# Patient Record
Sex: FEMALE | Race: WHITE | NOT HISPANIC OR LATINO | Employment: OTHER | URBAN - METROPOLITAN AREA
[De-identification: names, ages, dates, MRNs, and addresses within clinical notes are randomized per-mention and may not be internally consistent; named-entity substitution may affect disease eponyms.]

---

## 2017-04-20 ENCOUNTER — HOSPITAL ENCOUNTER (OUTPATIENT)
Dept: MRI IMAGING | Facility: HOSPITAL | Age: 57
Discharge: HOME | End: 2017-04-20
Attending: FAMILY MEDICINE | Admitting: FAMILY MEDICINE

## 2017-07-11 ENCOUNTER — CONVERSION ENCOUNTER (OUTPATIENT)
Dept: CARDIOLOGY | Facility: CLINIC | Age: 57
End: 2017-07-11

## 2017-08-02 ENCOUNTER — CONVERSION ENCOUNTER (OUTPATIENT)
Dept: CARDIOLOGY | Facility: CLINIC | Age: 57
End: 2017-08-02

## 2018-07-10 ENCOUNTER — HOSPITAL ENCOUNTER (OUTPATIENT)
Dept: MRI IMAGING | Facility: HOSPITAL | Age: 58
Discharge: HOME | End: 2018-07-10
Attending: ORTHOPAEDIC SURGERY | Admitting: ORTHOPAEDIC SURGERY

## 2019-03-12 ENCOUNTER — HOSPITAL ENCOUNTER (OUTPATIENT)
Dept: LAB | Facility: HOSPITAL | Age: 59
Discharge: HOME | End: 2019-03-12
Attending: NURSE PRACTITIONER | Admitting: NURSE PRACTITIONER

## 2019-03-12 ENCOUNTER — CONVERSION ENCOUNTER (OUTPATIENT)
Dept: CARDIOLOGY | Facility: CLINIC | Age: 59
End: 2019-03-12

## 2019-03-12 LAB
ALBUMIN SERPL-MCNC: 4 G/DL (ref 3.5–4.8)
ALBUMIN/GLOB SERPL: 1 {RATIO} (ref 1–1.7)
ALP SERPL-CCNC: 76 IU/L (ref 32–91)
ALT SERPL-CCNC: 22 IU/L (ref 14–54)
ANION GAP SERPL CALC-SCNC: 20.7 MMOL/L (ref 10–20)
AST SERPL-CCNC: 31 IU/L (ref 15–41)
BASOPHILS # BLD AUTO: 0 10*3/UL (ref 0–0.2)
BASOPHILS NFR BLD AUTO: 0 % (ref 0–2)
BILIRUB SERPL-MCNC: 1.3 MG/DL (ref 0.3–1.2)
BUN SERPL-MCNC: 14 MG/DL (ref 8–20)
BUN/CREAT SERPL: 17.5 (ref 5.4–26.2)
CALCIUM SERPL-MCNC: 9.5 MG/DL (ref 8.9–10.3)
CHLORIDE SERPL-SCNC: 93 MMOL/L (ref 101–111)
CONV CO2: 25 MMOL/L (ref 22–32)
CONV TOTAL PROTEIN: 8.1 G/DL (ref 6.1–7.9)
CREAT UR-MCNC: 0.8 MG/DL (ref 0.4–1)
DIFFERENTIAL METHOD BLD: (no result)
DIGOXIN SERPL-MCNC: 0.6 NG/ML (ref 0.8–2)
EOSINOPHIL # BLD AUTO: 0.1 10*3/UL (ref 0–0.3)
EOSINOPHIL # BLD AUTO: 1 % (ref 0–3)
ERYTHROCYTE [DISTWIDTH] IN BLOOD BY AUTOMATED COUNT: 14.2 % (ref 11.5–14.5)
GLOBULIN UR ELPH-MCNC: 4.1 G/DL (ref 2.5–3.8)
GLUCOSE SERPL-MCNC: 138 MG/DL (ref 65–99)
HCT VFR BLD AUTO: 54.8 % (ref 35–49)
HGB BLD-MCNC: (no result) G/DL (ref 12–15)
HGB BLD-MCNC: 2002 G/DL
LYMPHOCYTES # BLD AUTO: 2.8 10*3/UL (ref 0.8–4.8)
LYMPHOCYTES NFR BLD AUTO: 35 % (ref 18–42)
MAGNESIUM SERPL-MCNC: 1.9 MG/DL (ref 1.8–2.5)
MCH RBC QN AUTO: 31.2 PG (ref 26–32)
MCHC RBC AUTO-ENTMCNC: 33.9 G/DL (ref 32–36)
MCV RBC AUTO: 92 FL (ref 80–94)
MONOCYTES # BLD AUTO: 0.8 10*3/UL (ref 0.1–1.3)
MONOCYTES NFR BLD AUTO: 10 % (ref 2–11)
NEUTROPHILS # BLD AUTO: 4.3 10*3/UL (ref 2.3–8.6)
NEUTROPHILS NFR BLD AUTO: 54 % (ref 50–75)
NRBC BLD AUTO-RTO: 0 /100{WBCS}
NRBC/RBC NFR BLD MANUAL: 0 10*3/UL
PLATELET # BLD AUTO: 211 10*3/UL (ref 150–450)
PMV BLD AUTO: 8.1 FL (ref 7.4–10.4)
POTASSIUM SERPL-SCNC: 3.7 MMOL/L (ref 3.6–5.1)
RBC # BLD AUTO: 5.95 10*6/UL (ref 4–5.4)
SODIUM SERPL-SCNC: 135 MMOL/L (ref 136–144)
WBC # BLD AUTO: 7.9 10*3/UL (ref 4.5–11.5)

## 2019-04-25 ENCOUNTER — HOSPITAL ENCOUNTER (OUTPATIENT)
Dept: NUCLEAR MEDICINE | Facility: HOSPITAL | Age: 59
Discharge: HOME | End: 2019-04-25
Attending: NURSE PRACTITIONER | Admitting: NURSE PRACTITIONER

## 2019-06-04 VITALS
HEART RATE: 97 BPM | BODY MASS INDEX: 36.68 KG/M2 | WEIGHT: 254 LBS | SYSTOLIC BLOOD PRESSURE: 110 MMHG | SYSTOLIC BLOOD PRESSURE: 120 MMHG | HEIGHT: 68 IN | DIASTOLIC BLOOD PRESSURE: 75 MMHG | HEIGHT: 68 IN | BODY MASS INDEX: 36.83 KG/M2 | WEIGHT: 242 LBS | HEART RATE: 80 BPM | HEIGHT: 68 IN | HEART RATE: 101 BPM | DIASTOLIC BLOOD PRESSURE: 62 MMHG | WEIGHT: 243 LBS | SYSTOLIC BLOOD PRESSURE: 120 MMHG | DIASTOLIC BLOOD PRESSURE: 90 MMHG | BODY MASS INDEX: 38.49 KG/M2

## 2019-06-21 ENCOUNTER — OFFICE (OUTPATIENT)
Dept: URBAN - METROPOLITAN AREA CLINIC 64 | Facility: CLINIC | Age: 59
End: 2019-06-21

## 2019-06-21 ENCOUNTER — TRANSCRIBE ORDERS (OUTPATIENT)
Dept: ULTRASOUND IMAGING | Facility: HOSPITAL | Age: 59
End: 2019-06-21

## 2019-06-21 VITALS
SYSTOLIC BLOOD PRESSURE: 128 MMHG | HEIGHT: 68 IN | HEART RATE: 91 BPM | DIASTOLIC BLOOD PRESSURE: 76 MMHG | WEIGHT: 232 LBS

## 2019-06-21 DIAGNOSIS — Z12.11 ENCOUNTER FOR SCREENING FOR MALIGNANT NEOPLASM OF COLON: ICD-10-CM

## 2019-06-21 DIAGNOSIS — B18.2 CHRONIC HEPATITIS C WITHOUT HEPATIC COMA (HCC): Primary | ICD-10-CM

## 2019-06-21 DIAGNOSIS — K21.9 GASTRO-ESOPHAGEAL REFLUX DISEASE WITHOUT ESOPHAGITIS: ICD-10-CM

## 2019-06-21 DIAGNOSIS — B18.2 CHRONIC VIRAL HEPATITIS C: ICD-10-CM

## 2019-06-21 PROCEDURE — 99203 OFFICE O/P NEW LOW 30 MIN: CPT | Performed by: INTERNAL MEDICINE

## 2019-07-01 ENCOUNTER — APPOINTMENT (OUTPATIENT)
Dept: LAB | Facility: HOSPITAL | Age: 59
End: 2019-07-01

## 2019-07-01 ENCOUNTER — APPOINTMENT (OUTPATIENT)
Dept: ULTRASOUND IMAGING | Facility: HOSPITAL | Age: 59
End: 2019-07-01

## 2019-07-01 ENCOUNTER — HOSPITAL ENCOUNTER (OUTPATIENT)
Dept: ONCOLOGY | Facility: HOSPITAL | Age: 59
Setting detail: INFUSION SERIES
Discharge: HOME | End: 2019-07-01

## 2019-07-01 ENCOUNTER — CONSULT (OUTPATIENT)
Dept: ONCOLOGY | Facility: CLINIC | Age: 59
End: 2019-07-01

## 2019-07-01 VITALS
HEIGHT: 68 IN | SYSTOLIC BLOOD PRESSURE: 117 MMHG | TEMPERATURE: 98.1 F | BODY MASS INDEX: 37.13 KG/M2 | RESPIRATION RATE: 18 BRPM | DIASTOLIC BLOOD PRESSURE: 75 MMHG | WEIGHT: 245 LBS | HEART RATE: 90 BPM

## 2019-07-01 VITALS — HEART RATE: 84 BPM | DIASTOLIC BLOOD PRESSURE: 74 MMHG | SYSTOLIC BLOOD PRESSURE: 112 MMHG

## 2019-07-01 DIAGNOSIS — D75.1 POLYCYTHEMIA: ICD-10-CM

## 2019-07-01 DIAGNOSIS — D75.1 POLYCYTHEMIA: Primary | ICD-10-CM

## 2019-07-01 LAB
ALBUMIN SERPL-MCNC: 3.6 G/DL (ref 3.5–4.8)
ALBUMIN/GLOB SERPL: 1.1 G/DL (ref 1–1.7)
ALP SERPL-CCNC: 67 U/L (ref 32–91)
ALT SERPL W P-5'-P-CCNC: 26 U/L (ref 14–54)
ANION GAP SERPL CALCULATED.3IONS-SCNC: 16.6 MMOL/L (ref 10–20)
AST SERPL-CCNC: 23 U/L (ref 15–41)
BASOPHILS # BLD AUTO: 0.03 10*3/MM3 (ref 0–0.2)
BASOPHILS NFR BLD AUTO: 0.5 % (ref 0–1.5)
BILIRUB SERPL-MCNC: 0.9 MG/DL (ref 0.3–1.2)
BUN BLD-MCNC: 7 MG/DL (ref 8–20)
BUN/CREAT SERPL: 11.7 (ref 5.4–26.2)
CALCIUM SPEC-SCNC: 8.6 MG/DL (ref 8.9–10.3)
CHLORIDE SERPL-SCNC: 98 MMOL/L (ref 101–111)
CO2 SERPL-SCNC: 27 MMOL/L (ref 22–32)
CREAT BLD-MCNC: 0.6 MG/DL (ref 0.4–1)
DEPRECATED RDW RBC AUTO: 45.5 FL (ref 37–54)
EOSINOPHIL # BLD AUTO: 0.1 10*3/MM3 (ref 0–0.4)
EOSINOPHIL NFR BLD AUTO: 1.6 % (ref 0.3–6.2)
ERYTHROCYTE [DISTWIDTH] IN BLOOD BY AUTOMATED COUNT: 14 % (ref 12.3–15.4)
GFR SERPL CREATININE-BSD FRML MDRD: 103 ML/MIN/1.73
GLOBULIN UR ELPH-MCNC: 3.4 GM/DL (ref 2.5–3.8)
GLUCOSE BLD-MCNC: 111 MG/DL (ref 65–99)
HCT VFR BLD AUTO: 51 % (ref 34–46.6)
HGB BLD-MCNC: 16.9 G/DL (ref 12–15.9)
LYMPHOCYTES # BLD AUTO: 2.47 10*3/MM3 (ref 0.7–3.1)
LYMPHOCYTES NFR BLD AUTO: 38.5 % (ref 19.6–45.3)
MCH RBC QN AUTO: 29.9 PG (ref 26.6–33)
MCHC RBC AUTO-ENTMCNC: 33.1 G/DL (ref 31.5–35.7)
MCV RBC AUTO: 90.1 FL (ref 79–97)
MONOCYTES # BLD AUTO: 0.58 10*3/MM3 (ref 0.1–0.9)
MONOCYTES NFR BLD AUTO: 9 % (ref 5–12)
NEUTROPHILS # BLD AUTO: 3.23 10*3/MM3 (ref 1.7–7)
NEUTROPHILS NFR BLD AUTO: 50.4 % (ref 42.7–76)
PLATELET # BLD AUTO: 155 10*3/MM3 (ref 140–450)
PMV BLD AUTO: 9.5 FL (ref 6–12)
POTASSIUM BLD-SCNC: 3.6 MMOL/L (ref 3.6–5.1)
PROT SERPL-MCNC: 7 G/DL (ref 6.1–7.9)
RBC # BLD AUTO: 5.66 10*6/MM3 (ref 3.77–5.28)
SODIUM BLD-SCNC: 138 MMOL/L (ref 136–144)
WBC NRBC COR # BLD: 6.41 10*3/MM3 (ref 3.4–10.8)

## 2019-07-01 PROCEDURE — 88273 CYTOGENETICS 10-30: CPT

## 2019-07-01 PROCEDURE — 99195 PHLEBOTOMY: CPT | Performed by: INTERNAL MEDICINE

## 2019-07-01 PROCEDURE — 80053 COMPREHEN METABOLIC PANEL: CPT | Performed by: NURSE PRACTITIONER

## 2019-07-01 PROCEDURE — 88275 CYTOGENETICS 100-300: CPT

## 2019-07-01 PROCEDURE — 88271 CYTOGENETICS DNA PROBE: CPT

## 2019-07-01 PROCEDURE — 99205 OFFICE O/P NEW HI 60 MIN: CPT | Performed by: INTERNAL MEDICINE

## 2019-07-01 PROCEDURE — 85025 COMPLETE CBC W/AUTO DIFF WBC: CPT | Performed by: INTERNAL MEDICINE

## 2019-07-01 PROCEDURE — 36415 COLL VENOUS BLD VENIPUNCTURE: CPT | Performed by: INTERNAL MEDICINE

## 2019-07-01 RX ORDER — ALBUTEROL SULFATE 90 UG/1
AEROSOL, METERED RESPIRATORY (INHALATION)
COMMUNITY
Start: 2019-03-12

## 2019-07-01 RX ORDER — BUMETANIDE 2 MG/1
TABLET ORAL
COMMUNITY
Start: 2015-03-31

## 2019-07-01 RX ORDER — HYDROCODONE BITARTRATE AND ACETAMINOPHEN 10; 325 MG/1; MG/1
TABLET ORAL
COMMUNITY
Start: 2015-03-31 | End: 2022-05-25

## 2019-07-01 RX ORDER — DIGOXIN 125 MCG
125 TABLET ORAL DAILY
COMMUNITY
Start: 2015-05-08

## 2019-07-01 RX ORDER — CARVEDILOL 12.5 MG/1
TABLET ORAL
COMMUNITY
Start: 2017-08-30 | End: 2019-11-12 | Stop reason: SDUPTHER

## 2019-07-01 RX ORDER — FENTANYL 100 UG/H
PATCH TRANSDERMAL
COMMUNITY
Start: 2015-03-31 | End: 2022-05-25

## 2019-07-01 RX ORDER — DILTIAZEM HYDROCHLORIDE 120 MG/1
120 TABLET, FILM COATED ORAL DAILY
COMMUNITY
End: 2019-10-10 | Stop reason: SDUPTHER

## 2019-07-01 RX ORDER — LORATADINE 10 MG/1
TABLET ORAL EVERY 24 HOURS
COMMUNITY
Start: 2019-03-12

## 2019-07-01 RX ORDER — ALPRAZOLAM 0.5 MG/1
TABLET ORAL
COMMUNITY
Start: 2017-08-02

## 2019-07-01 RX ORDER — RANITIDINE 150 MG/1
CAPSULE ORAL
COMMUNITY
Start: 2015-08-07 | End: 2022-05-25

## 2019-07-01 RX ORDER — CHOLECALCIFEROL (VITAMIN D3) 125 MCG
5 CAPSULE ORAL NIGHTLY
COMMUNITY
End: 2022-07-19

## 2019-07-01 NOTE — PROGRESS NOTES
Hematology/Oncology Outpatient Consultation    Ana Griggs  1960    Primary Care Physician: Clay Watson MD  Referring Physician: Reg Nava MD  Reason For Consult: abnormal cbc    No chief complaint on file.  polycythemia    History of Present Illness:  Pt started smoking at age 17. Lab work up reveals highly elevated hemoglobin. Pt was sent to [facility] and seen initially on 7/1/19  History reviewed. No pertinent past medical history.    History reviewed. No pertinent surgical history.      Current Outpatient Medications:   •  albuterol sulfate HFA (PROAIR HFA) 108 (90 Base) MCG/ACT inhaler, PROAIR  (90 Base) MCG/ACT AERS, Disp: , Rfl:   •  ALPRAZolam (XANAX) 0.5 MG tablet, ALPRAZOLAM 0.5 MG TABS, Disp: , Rfl:   •  aspirin 81 MG tablet, ASPIRIN 81 MG ORAL TABLET, Disp: , Rfl:   •  bumetanide (BUMEX) 2 MG tablet, BUMETANIDE 2 MG TABS, Disp: , Rfl:   •  carvedilol (COREG) 6.25 MG tablet, CARVEDILOL 6.25 MG TABS, Disp: , Rfl:   •  digoxin (LANOXIN) 125 MCG tablet, Take 125 mcg by mouth Daily., Disp: , Rfl:   •  diltiaZEM (CARDIZEM) 60 MG tablet, Take 60 mg by mouth 4 (Four) Times a Day., Disp: , Rfl:   •  Empagliflozin (JARDIANCE) 25 MG tablet, JARDIANCE 25 MG TABS, Disp: , Rfl:   •  fentaNYL (DURAGESIC) 100 MCG/HR patch, FENTANYL 100 MCG/HR PT72, Disp: , Rfl:   •  HYDROcodone-acetaminophen (NORCO)  MG per tablet, HYDROCODONE-ACETAMINOPHEN  MG TABS, Disp: , Rfl:   •  loratadine (CLARITIN) 10 MG tablet, Daily., Disp: , Rfl:   •  melatonin 5 MG tablet tablet, Take 5 mg by mouth Every Night., Disp: , Rfl:   •  metFORMIN (GLUCOPHAGE) 500 MG tablet, METFORMIN  MG TABS, Disp: , Rfl:   •  ranitidine (ZANTAC) 150 MG capsule, RANITIDINE  MG CAPS, Disp: , Rfl:   •  rivaroxaban (XARELTO) 20 MG tablet, XARELTO 20 MG TABS, Disp: , Rfl:     No Known Allergies      There is no immunization history on file for this patient.    History reviewed. No pertinent family  "history.    Cancer-related family history is not on file.    Social History     Tobacco Use   • Smoking status: Not on file   Substance Use Topics   • Alcohol use: Not on file   • Drug use: Not on file     soa on exertion. Reports to forgetful ness. Drowsy.  ROS:     Review of Systems   HENT: Negative for nosebleeds and trouble swallowing.    Eyes: Negative for visual disturbance.   Respiratory: Negative for cough, shortness of breath and wheezing.    Cardiovascular: Negative for chest pain.   Gastrointestinal: Negative for abdominal pain and blood in stool.   Genitourinary: Negative for dysuria and hematuria.   Musculoskeletal: Negative for joint swelling.   Skin: Negative for rash.   Neurological: Negative for seizures.   Hematological: Does not bruise/bleed easily.   Psychiatric/Behavioral: The patient is not nervous/anxious.        Objective:    Vitals:    07/01/19 1433   BP: 117/75   Pulse: 90   Resp: 18   Temp: 98.1 °F (36.7 °C)   Weight: 111 kg (245 lb)   Height: 172.7 cm (68\")   PainSc:   8   PainLoc: Knee  Comment: left           Physical Exam:    Physical Exam   Constitutional: She is oriented to person, place, and time.   HENT:   Head: Normocephalic and atraumatic.   Neck: Trachea normal and normal range of motion.   Cardiovascular: S1 normal and S2 normal.   Pulmonary/Chest: Breath sounds normal.   Abdominal: Soft. Bowel sounds are normal. There is no hepatosplenomegaly.   Neurological: She is alert and oriented to person, place, and time. She has normal strength.   Skin: Skin is warm and dry.   Psychiatric: She has a normal mood and affect. Her speech is normal.       RECENT LABS  WBC   Date Value Ref Range Status   07/01/2019 6.41 3.40 - 10.80 10*3/mm3 Final     RBC   Date Value Ref Range Status   07/01/2019 5.66 (H) 3.77 - 5.28 10*6/mm3 Final     Hemoglobin   Date Value Ref Range Status   07/01/2019 16.9 (H) 12.0 - 15.9 g/dL Final     Hematocrit   Date Value Ref Range Status   07/01/2019 51.0 (H) 34.0 " - 46.6 % Final     MCV   Date Value Ref Range Status   07/01/2019 90.1 79.0 - 97.0 fL Final     MCH   Date Value Ref Range Status   07/01/2019 29.9 26.6 - 33.0 pg Final     MCHC   Date Value Ref Range Status   07/01/2019 33.1 31.5 - 35.7 g/dL Final     RDW   Date Value Ref Range Status   07/01/2019 14.0 12.3 - 15.4 % Final     RDW-SD   Date Value Ref Range Status   07/01/2019 45.5 37.0 - 54.0 fl Final     MPV   Date Value Ref Range Status   07/01/2019 9.5 6.0 - 12.0 fL Final     Platelets   Date Value Ref Range Status   07/01/2019 155 140 - 450 10*3/mm3 Final     Neutrophil %   Date Value Ref Range Status   07/01/2019 50.4 42.7 - 76.0 % Final     Lymphocyte %   Date Value Ref Range Status   07/01/2019 38.5 19.6 - 45.3 % Final     Monocyte %   Date Value Ref Range Status   07/01/2019 9.0 5.0 - 12.0 % Final     Eosinophil %   Date Value Ref Range Status   07/01/2019 1.6 0.3 - 6.2 % Final     Basophil %   Date Value Ref Range Status   07/01/2019 0.5 0.0 - 1.5 % Final     Neutrophils, Absolute   Date Value Ref Range Status   07/01/2019 3.23 1.70 - 7.00 10*3/mm3 Final     Lymphocytes, Absolute   Date Value Ref Range Status   07/01/2019 2.47 0.70 - 3.10 10*3/mm3 Final     Monocytes, Absolute   Date Value Ref Range Status   07/01/2019 0.58 0.10 - 0.90 10*3/mm3 Final     Eosinophils, Absolute   Date Value Ref Range Status   07/01/2019 0.10 0.00 - 0.40 10*3/mm3 Final     Basophils, Absolute   Date Value Ref Range Status   07/01/2019 0.03 0.00 - 0.20 10*3/mm3 Final     nRBC   Date Value Ref Range Status   03/12/2019 0 0 /100[WBCs] Final       Lab Results   Component Value Date    GLUCOSE 138 (H) 03/12/2019    BUN 14 03/12/2019    CREATININE 0.8 03/12/2019    BCR 17.5 03/12/2019    K 3.7 03/12/2019    CO2 25 03/12/2019    CALCIUM 9.5 03/12/2019    ALBUMIN 4.0 03/12/2019    LABIL2 1.0 03/12/2019    AST 31 03/12/2019    ALT 22 03/12/2019         Assessment    1. Secondary polycythemia  2. Tobacco  abuse  3. Obesity  4. chf  5. ecog1    Plan:    1. Start phlebotomy today. 500cc today  2. Check JAK2, BCR-abl1  3. Loose weight  4. 4. rtc 2 weeks      I have reviewed labs results, imaging, vitals, and medications with the patient today.      I counselled Ana of the risks of continuing to use tobacco and cessation.    During this visit, I spent 3-10 minutes counseling the patient regarding tobacco cessation.     Patient verbalized understanding and is in agreement of the above plan.        This report was compiled using Dragon voice recognition software. I have made every effort to proof read this document; however, typographical errors may persist.

## 2019-07-05 LAB
JAK2 EXON 12 MUT TESTED BLD/T: NORMAL
JAK2 P.V617F BLD/T QL: NORMAL
LAB DIRECTOR NAME PROVIDER: NORMAL
REF LAB TEST METHOD: NORMAL
REFERENCES: NORMAL

## 2019-07-11 LAB
CELLS ANALYZED: 200
CELLS COUNTED: 200
CHROM ANALY RESULT (ISCN): NORMAL
INTERPRETATION: NORMAL
LAB DIRECTOR NAME PROVIDER: NORMAL
SPECIMEN SOURCE: NORMAL

## 2019-08-12 RX ORDER — SULFAMETHOXAZOLE AND TRIMETHOPRIM 800; 160 MG/1; MG/1
1 TABLET ORAL 2 TIMES DAILY
COMMUNITY
Start: 2019-08-07 | End: 2022-05-25

## 2019-08-12 RX ORDER — PROMETHAZINE HYDROCHLORIDE 25 MG/1
25 TABLET ORAL
COMMUNITY

## 2019-08-14 ENCOUNTER — ANESTHESIA EVENT (OUTPATIENT)
Dept: GASTROENTEROLOGY | Facility: HOSPITAL | Age: 59
End: 2019-08-14

## 2019-08-15 ENCOUNTER — ON CAMPUS - OUTPATIENT (OUTPATIENT)
Dept: URBAN - METROPOLITAN AREA HOSPITAL 85 | Facility: HOSPITAL | Age: 59
End: 2019-08-15

## 2019-08-15 ENCOUNTER — ANESTHESIA (OUTPATIENT)
Dept: GASTROENTEROLOGY | Facility: HOSPITAL | Age: 59
End: 2019-08-15

## 2019-08-15 ENCOUNTER — HOSPITAL ENCOUNTER (OUTPATIENT)
Facility: HOSPITAL | Age: 59
Setting detail: HOSPITAL OUTPATIENT SURGERY
Discharge: HOME | End: 2019-08-15
Attending: INTERNAL MEDICINE | Admitting: INTERNAL MEDICINE

## 2019-08-15 VITALS
SYSTOLIC BLOOD PRESSURE: 109 MMHG | TEMPERATURE: 98.4 F | RESPIRATION RATE: 16 BRPM | OXYGEN SATURATION: 97 % | HEIGHT: 68 IN | BODY MASS INDEX: 37.39 KG/M2 | HEART RATE: 96 BPM | WEIGHT: 246.69 LBS | DIASTOLIC BLOOD PRESSURE: 80 MMHG

## 2019-08-15 DIAGNOSIS — Z12.11 ENCOUNTER FOR SCREENING FOR MALIGNANT NEOPLASM OF COLON: ICD-10-CM

## 2019-08-15 DIAGNOSIS — D12.3 BENIGN NEOPLASM OF TRANSVERSE COLON: ICD-10-CM

## 2019-08-15 DIAGNOSIS — K57.30 DIVERTICULOSIS OF LARGE INTESTINE WITHOUT PERFORATION OR ABS: ICD-10-CM

## 2019-08-15 DIAGNOSIS — D12.2 BENIGN NEOPLASM OF ASCENDING COLON: ICD-10-CM

## 2019-08-15 DIAGNOSIS — K63.5 POLYP OF TRANSVERSE COLON: ICD-10-CM

## 2019-08-15 DIAGNOSIS — K64.8 OTHER HEMORRHOIDS: ICD-10-CM

## 2019-08-15 DIAGNOSIS — K63.5 POLYP OF ASCENDING COLON: ICD-10-CM

## 2019-08-15 LAB
GLUCOSE BLDC GLUCOMTR-MCNC: 129 MG/DL (ref 70–105)
GLUCOSE BLDC GLUCOMTR-MCNC: 138 MG/DL (ref 70–105)

## 2019-08-15 PROCEDURE — 82962 GLUCOSE BLOOD TEST: CPT

## 2019-08-15 PROCEDURE — 45385 COLONOSCOPY W/LESION REMOVAL: CPT | Mod: PT | Performed by: INTERNAL MEDICINE

## 2019-08-15 PROCEDURE — 88305 TISSUE EXAM BY PATHOLOGIST: CPT | Performed by: INTERNAL MEDICINE

## 2019-08-15 PROCEDURE — 25010000002 PROPOFOL 10 MG/ML EMULSION: Performed by: ANESTHESIOLOGY

## 2019-08-15 RX ORDER — IPRATROPIUM BROMIDE AND ALBUTEROL SULFATE 2.5; .5 MG/3ML; MG/3ML
3 SOLUTION RESPIRATORY (INHALATION) ONCE AS NEEDED
Status: DISCONTINUED | OUTPATIENT
Start: 2019-08-15 | End: 2019-08-15 | Stop reason: HOSPADM

## 2019-08-15 RX ORDER — PROMETHAZINE HYDROCHLORIDE 25 MG/ML
6.25 INJECTION, SOLUTION INTRAMUSCULAR; INTRAVENOUS ONCE AS NEEDED
Status: DISCONTINUED | OUTPATIENT
Start: 2019-08-15 | End: 2019-08-15 | Stop reason: HOSPADM

## 2019-08-15 RX ORDER — SODIUM CHLORIDE 0.9 % (FLUSH) 0.9 %
3-10 SYRINGE (ML) INJECTION AS NEEDED
Status: DISCONTINUED | OUTPATIENT
Start: 2019-08-15 | End: 2019-08-15 | Stop reason: HOSPADM

## 2019-08-15 RX ORDER — ONDANSETRON 2 MG/ML
4 INJECTION INTRAMUSCULAR; INTRAVENOUS ONCE AS NEEDED
Status: DISCONTINUED | OUTPATIENT
Start: 2019-08-15 | End: 2019-08-15 | Stop reason: HOSPADM

## 2019-08-15 RX ORDER — OXYCODONE HYDROCHLORIDE 5 MG/1
7.5 TABLET ORAL ONCE AS NEEDED
Status: DISCONTINUED | OUTPATIENT
Start: 2019-08-15 | End: 2019-08-15 | Stop reason: HOSPADM

## 2019-08-15 RX ORDER — HYDRALAZINE HYDROCHLORIDE 20 MG/ML
5 INJECTION INTRAMUSCULAR; INTRAVENOUS
Status: DISCONTINUED | OUTPATIENT
Start: 2019-08-15 | End: 2019-08-15 | Stop reason: SDUPTHER

## 2019-08-15 RX ORDER — PROMETHAZINE HYDROCHLORIDE 25 MG/1
25 SUPPOSITORY RECTAL ONCE AS NEEDED
Status: DISCONTINUED | OUTPATIENT
Start: 2019-08-15 | End: 2019-08-15 | Stop reason: HOSPADM

## 2019-08-15 RX ORDER — NALOXONE HCL 0.4 MG/ML
0.4 VIAL (ML) INJECTION AS NEEDED
Status: DISCONTINUED | OUTPATIENT
Start: 2019-08-15 | End: 2019-08-15 | Stop reason: HOSPADM

## 2019-08-15 RX ORDER — PROMETHAZINE HYDROCHLORIDE 25 MG/1
25 TABLET ORAL ONCE AS NEEDED
Status: DISCONTINUED | OUTPATIENT
Start: 2019-08-15 | End: 2019-08-15 | Stop reason: SDUPTHER

## 2019-08-15 RX ORDER — LIDOCAINE HYDROCHLORIDE 10 MG/ML
INJECTION, SOLUTION EPIDURAL; INFILTRATION; INTRACAUDAL; PERINEURAL AS NEEDED
Status: DISCONTINUED | OUTPATIENT
Start: 2019-08-15 | End: 2019-08-15 | Stop reason: SURG

## 2019-08-15 RX ORDER — ONDANSETRON 2 MG/ML
4 INJECTION INTRAMUSCULAR; INTRAVENOUS ONCE AS NEEDED
Status: DISCONTINUED | OUTPATIENT
Start: 2019-08-15 | End: 2019-08-15 | Stop reason: SDUPTHER

## 2019-08-15 RX ORDER — SODIUM CHLORIDE 9 MG/ML
9 INJECTION, SOLUTION INTRAVENOUS CONTINUOUS PRN
Status: DISCONTINUED | OUTPATIENT
Start: 2019-08-15 | End: 2019-08-15 | Stop reason: HOSPADM

## 2019-08-15 RX ORDER — PROMETHAZINE HYDROCHLORIDE 25 MG/ML
6.25 INJECTION, SOLUTION INTRAMUSCULAR; INTRAVENOUS ONCE AS NEEDED
Status: DISCONTINUED | OUTPATIENT
Start: 2019-08-15 | End: 2019-08-15 | Stop reason: SDUPTHER

## 2019-08-15 RX ORDER — FLUMAZENIL 0.1 MG/ML
0.2 INJECTION INTRAVENOUS AS NEEDED
Status: DISCONTINUED | OUTPATIENT
Start: 2019-08-15 | End: 2019-08-15 | Stop reason: SDUPTHER

## 2019-08-15 RX ORDER — LABETALOL HYDROCHLORIDE 5 MG/ML
5 INJECTION, SOLUTION INTRAVENOUS
Status: DISCONTINUED | OUTPATIENT
Start: 2019-08-15 | End: 2019-08-15 | Stop reason: HOSPADM

## 2019-08-15 RX ORDER — NALOXONE HCL 0.4 MG/ML
0.4 VIAL (ML) INJECTION AS NEEDED
Status: DISCONTINUED | OUTPATIENT
Start: 2019-08-15 | End: 2019-08-15

## 2019-08-15 RX ORDER — HYDRALAZINE HYDROCHLORIDE 20 MG/ML
5 INJECTION INTRAMUSCULAR; INTRAVENOUS
Status: DISCONTINUED | OUTPATIENT
Start: 2019-08-15 | End: 2019-08-15 | Stop reason: HOSPADM

## 2019-08-15 RX ORDER — LORAZEPAM 2 MG/ML
1 INJECTION INTRAMUSCULAR
Status: DISCONTINUED | OUTPATIENT
Start: 2019-08-15 | End: 2019-08-15 | Stop reason: HOSPADM

## 2019-08-15 RX ORDER — MEPERIDINE HYDROCHLORIDE 25 MG/ML
12.5 INJECTION INTRAMUSCULAR; INTRAVENOUS; SUBCUTANEOUS
Status: DISCONTINUED | OUTPATIENT
Start: 2019-08-15 | End: 2019-08-15 | Stop reason: SDUPTHER

## 2019-08-15 RX ORDER — SODIUM CHLORIDE 0.9 % (FLUSH) 0.9 %
3 SYRINGE (ML) INJECTION EVERY 12 HOURS SCHEDULED
Status: DISCONTINUED | OUTPATIENT
Start: 2019-08-15 | End: 2019-08-15 | Stop reason: HOSPADM

## 2019-08-15 RX ORDER — OXYCODONE HYDROCHLORIDE 5 MG/1
7.5 TABLET ORAL ONCE AS NEEDED
Status: DISCONTINUED | OUTPATIENT
Start: 2019-08-15 | End: 2019-08-15 | Stop reason: SDUPTHER

## 2019-08-15 RX ORDER — LORAZEPAM 2 MG/ML
1 INJECTION INTRAMUSCULAR
Status: DISCONTINUED | OUTPATIENT
Start: 2019-08-15 | End: 2019-08-15 | Stop reason: SDUPTHER

## 2019-08-15 RX ORDER — PROPOFOL 10 MG/ML
VIAL (ML) INTRAVENOUS AS NEEDED
Status: DISCONTINUED | OUTPATIENT
Start: 2019-08-15 | End: 2019-08-15 | Stop reason: SURG

## 2019-08-15 RX ORDER — MEPERIDINE HYDROCHLORIDE 25 MG/ML
12.5 INJECTION INTRAMUSCULAR; INTRAVENOUS; SUBCUTANEOUS
Status: DISCONTINUED | OUTPATIENT
Start: 2019-08-15 | End: 2019-08-15 | Stop reason: HOSPADM

## 2019-08-15 RX ORDER — LABETALOL HYDROCHLORIDE 5 MG/ML
5 INJECTION, SOLUTION INTRAVENOUS
Status: DISCONTINUED | OUTPATIENT
Start: 2019-08-15 | End: 2019-08-15 | Stop reason: SDUPTHER

## 2019-08-15 RX ORDER — MAGNESIUM CARB/ALUMINUM HYDROX 105-160MG
296 TABLET,CHEWABLE ORAL ONCE
Status: DISCONTINUED | OUTPATIENT
Start: 2019-08-15 | End: 2019-08-15 | Stop reason: HOSPADM

## 2019-08-15 RX ORDER — PROMETHAZINE HYDROCHLORIDE 25 MG/1
25 TABLET ORAL ONCE AS NEEDED
Status: DISCONTINUED | OUTPATIENT
Start: 2019-08-15 | End: 2019-08-15 | Stop reason: HOSPADM

## 2019-08-15 RX ORDER — PROMETHAZINE HYDROCHLORIDE 25 MG/1
25 SUPPOSITORY RECTAL ONCE AS NEEDED
Status: DISCONTINUED | OUTPATIENT
Start: 2019-08-15 | End: 2019-08-15 | Stop reason: SDUPTHER

## 2019-08-15 RX ORDER — FLUMAZENIL 0.1 MG/ML
0.2 INJECTION INTRAVENOUS AS NEEDED
Status: DISCONTINUED | OUTPATIENT
Start: 2019-08-15 | End: 2019-08-15 | Stop reason: HOSPADM

## 2019-08-15 RX ORDER — IPRATROPIUM BROMIDE AND ALBUTEROL SULFATE 2.5; .5 MG/3ML; MG/3ML
3 SOLUTION RESPIRATORY (INHALATION) ONCE AS NEEDED
Status: DISCONTINUED | OUTPATIENT
Start: 2019-08-15 | End: 2019-08-15 | Stop reason: SDUPTHER

## 2019-08-15 RX ADMIN — SODIUM CHLORIDE 9 ML/HR: 0.9 INJECTION, SOLUTION INTRAVENOUS at 06:46

## 2019-08-15 RX ADMIN — PROPOFOL 50 MG: 10 INJECTION, EMULSION INTRAVENOUS at 07:58

## 2019-08-15 RX ADMIN — PROPOFOL 100 MG: 10 INJECTION, EMULSION INTRAVENOUS at 07:57

## 2019-08-15 RX ADMIN — PROPOFOL 50 MG: 10 INJECTION, EMULSION INTRAVENOUS at 08:04

## 2019-08-15 RX ADMIN — PROPOFOL 50 MG: 10 INJECTION, EMULSION INTRAVENOUS at 08:05

## 2019-08-15 RX ADMIN — LIDOCAINE HYDROCHLORIDE 40 MG: 10 INJECTION, SOLUTION EPIDURAL; INFILTRATION; INTRACAUDAL; PERINEURAL at 07:57

## 2019-08-15 NOTE — ANESTHESIA POSTPROCEDURE EVALUATION
Patient: Ana DAVIS Griggs    Procedure Summary     Date:  08/15/19 Room / Location:  [facility] ENDOSCOPY 4 / [facility] ENDOSCOPY    Anesthesia Start:  0736 Anesthesia Stop:  0814    Procedure:  COLONOSCOPY (N/A Rectum) Diagnosis:  (CHRONIC VIRAL, HEP C, REFLUX)    Surgeon:  Lilo Ferguson MD Provider:  Haydee Guzman MD    Anesthesia Type:  MAC ASA Status:  3          Anesthesia Type: MAC  Last vitals  BP   122/74 (08/15/19 0634)   Temp   98.4 °F (36.9 °C) (08/15/19 0634)   Pulse   87 (08/15/19 0634)   Resp   16 (08/15/19 0634)     SpO2   95 % (08/15/19 0634)     Post Anesthesia Care and Evaluation    Patient location during evaluation: PACU  Patient participation: complete - patient participated  Level of consciousness: awake  Pain score: 0 (See nurse's notes for pain score)  Pain management: adequate  Airway patency: patent  Anesthetic complications: No anesthetic complications  PONV Status: none  Cardiovascular status: acceptable  Respiratory status: acceptable  Hydration status: acceptable    Comments: Patient seen and examined postoperatively; vital signs stable; SpO2 greater than or equal to 90%; cardiopulmonary status stable; nausea/vomiting adequately controlled; pain adequately controlled; no apparent anesthesia complications; patient discharged from anesthesia care when discharge criteria were met

## 2019-08-15 NOTE — ANESTHESIA PREPROCEDURE EVALUATION
Anesthesia Evaluation     Patient summary reviewed   no history of anesthetic complications:  NPO Solid Status: > 8 hours  NPO Liquid Status: > 8 hours           Airway   Mallampati: III  TM distance: >3 FB  Neck ROM: full  No difficulty expected  Dental      Pulmonary - normal exam   (+) a smoker Current Abstained day of surgery, COPD mild,   Cardiovascular - normal exam    (+) hypertension well controlled, dysrhythmias, CHF,       Neuro/Psych  (+) numbness, psychiatric history Anxiety and Depression,     GI/Hepatic/Renal/Endo    (+)  GERD well controlled,  hepatitis C, liver disease, diabetes mellitus type 2 well controlled,     Musculoskeletal     (+) chronic pain,   Abdominal   (+) obese,    Substance History      OB/GYN negative ob/gyn ROS         Other   (+) arthritis                     Anesthesia Plan    ASA 3     MAC     intravenous induction   Anesthetic plan, all risks, benefits, and alternatives have been provided, discussed and informed consent has been obtained with: patient.

## 2019-08-15 NOTE — DISCHARGE INSTRUCTIONS
A responsible adult should stay with you and you should rest quietly for the rest of the day.    Do not drink alcohol, drive, operate any heavy machinery or power tools or make any legal/important decisions for the next 24 hours.     Progress your diet as tolerated.  If you begin to experience severe pain, increased shortness of breath, racing heartbeat or a fever above 101 F, seek immediate medical attention. Follow up with MD as instructed.     Watch for bleeding from rectum. If continuous and more than a teaspoonful at once, go to the emergency room

## 2019-08-15 NOTE — H&P
Patient Care Team:  Clay Watson MD as PCP - General  Clay Watson MD as PCP - Claims Attributed  Clay Watson MD as PCP - Family Medicine      Subjective .     History of present illness:    Ana Griggs is a 59 y.o. female who presents today for Procedure(s):  COLONOSCOPY for the indications listed below.     The updated Patient Profile was reviewed prior to the procedure, in conjunction with the Physical Exam, including medical conditions, surgical procedures, medications, allergies, family history and social history.     Pre-operatively, I reviewed the indication(s) for the procedure, the risks of the procedure [including but not limited to: unexpected bleeding possibly requiring hospitalization and/or unplanned repeat procedures, perforation possibly requiring surgical treatment, missed lesions and complications of sedation/MAC (also explained by anesthesia staff)].     I have evaluated the patient for risks associated with the planned anesthesia and the procedure to be performed and find the patient an acceptable candidate for IV sedation.    Multiple opportunities were provided for any questions or concerns, and all questions were answered satisfactorily before any anesthesia was administered. We will proceed with the planned procedure.      ASSESSMENT/PLAN:    colonoscopy      Past Medical History:  Past Medical History:   Diagnosis Date   • Anxiety    • Anxiety and depression    • Atrial fibrillation (CMS/HCC)     chronic   • Bronchitis, chronic (CMS/HCC)    • Cardiomegaly    • Cardiomyopathy (CMS/HCC)    • CHF (congestive heart failure) (CMS/HCC)    • COPD (chronic obstructive pulmonary disease) (CMS/HCC)    • Diabetes (CMS/HCC)    • GERD (gastroesophageal reflux disease)    • Hepatitis C    • HTN (hypertension)    • Neuropathy    • Numbness and tingling of both feet    • Pain    • Rheumatoid arthritis (CMS/HCC)        Past Surgical History:  Past Surgical History:    Procedure Laterality Date   • BUNIONECTOMY Bilateral    • DILATATION AND CURETTAGE Bilateral    • FEMUR FRACTURE SURGERY Left    • KNEE ARTHROSCOPY WITH OPEN LIGAMENT REPAIR Left    • POSTPARTUM TUBAL LIGATION Bilateral 1992       Social History:  Social History     Tobacco Use   • Smoking status: Current Every Day Smoker     Packs/day: 1.00   Substance Use Topics   • Alcohol use: No     Frequency: Never   • Drug use: No       Family History:  History reviewed. No pertinent family history.    Medications:  Medications Prior to Admission   Medication Sig Dispense Refill Last Dose   • albuterol sulfate HFA (PROAIR HFA) 108 (90 Base) MCG/ACT inhaler PROAIR  (90 Base) MCG/ACT AERS   Past Month at Unknown time   • ALPRAZolam (XANAX) 0.5 MG tablet ALPRAZOLAM 0.5 MG TABS   Past Month at Unknown time   • aspirin 81 MG tablet ASPIRIN 81 MG ORAL TABLET   Past Week at Unknown time   • bumetanide (BUMEX) 2 MG tablet BUMETANIDE 2 MG TABS   8/14/2019 at Unknown time   • carvedilol (COREG) 12.5 MG tablet CARVEDILOL 6.25 MG TABS   8/14/2019 at Unknown time   • digoxin (LANOXIN) 125 MCG tablet Take 125 mcg by mouth Daily.   8/14/2019 at Unknown time   • diltiaZEM (CARDIZEM) 120 MG tablet Take 120 mg by mouth Daily.   8/14/2019 at Unknown time   • Empagliflozin (JARDIANCE) 25 MG tablet JARDIANCE 25 MG TABS   Past Week at Unknown time   • fentaNYL (DURAGESIC) 100 MCG/HR patch FENTANYL 100 MCG/HR PT72   8/15/2019 at Unknown time   • HYDROcodone-acetaminophen (NORCO)  MG per tablet HYDROCODONE-ACETAMINOPHEN  MG TABS   8/14/2019 at Unknown time   • loratadine (CLARITIN) 10 MG tablet Daily.   Past Week at Unknown time   • melatonin 5 MG tablet tablet Take 5 mg by mouth Every Night.   Past Week at Unknown time   • metFORMIN (GLUCOPHAGE) 500 MG tablet METFORMIN  MG TABS   8/14/2019 at Unknown time   • promethazine (PHENERGAN) 25 MG tablet Take 25 mg by mouth 6 (Six) Times a Day.   Past Week at Unknown time   •  ranitidine (ZANTAC) 150 MG capsule RANITIDINE  MG CAPS   8/14/2019 at Unknown time   • rivaroxaban (XARELTO) 20 MG tablet XARELTO 20 MG TABS   Past Week at Unknown time   • sulfamethoxazole-trimethoprim (BACTRIM DS,SEPTRA DS) 800-160 MG per tablet Take 1 tablet by mouth 2 (Two) Times a Day.   8/14/2019 at Unknown time       Scheduled Meds:  sodium chloride 3 mL Intravenous Q12H     Continuous Infusions:  sodium chloride 9 mL/hr Last Rate: 9 mL/hr (08/15/19 0646)     PRN Meds:.sodium chloride  •  sodium chloride    ALLERGIES:  Medrol  [methylprednisolone]        Objective     Vital Signs:   Temp:  [98.4 °F (36.9 °C)] 98.4 °F (36.9 °C)  Heart Rate:  [87] 87  Resp:  [16] 16  BP: (122)/(74) 122/74    Physical Exam:      General Appearance:    Awake and alert, in no acute distress   Lungs:     Clear to auscultation bilaterally, respirations regular, even and unlabored    Heart:    Regular rhythm and normal rate, normal S1 and S2, no            murmur, no gallop, no rub   Abdomen:     Normal bowel sounds, soft, non-tender, no rebound or guarding, non-distended, no hepatosplenomegaly        Results Review:   I reviewed the patient's labs and imaging.  Lab Results (last 24 hours)     Procedure Component Value Units Date/Time    POC Glucose Once [[id number]]  (Abnormal) Collected:  08/15/19 0651    Specimen:  Blood Updated:  08/15/19 0653     Glucose 138 mg/dL      Comment: Serial Number: [id number]:  [id number]             Imaging Results (last 24 hours)     ** No results found for the last 24 hours. **             I discussed the patients findings and my recommendations with the patient.  Lilo Ferguson MD  08/15/19  7:11 AM

## 2019-08-15 NOTE — OP NOTE
COLONOSCOPY Procedure Report    Patient Name:  Ana Griggs  YOB: 1960    Date of Surgery:  8/15/2019     Pre-Op Diagnosis:  CHRONIC VIRAL, HEP C, REFLUX patient is here for screening colonoscopy.         Procedure/CPT® Codes:      Procedure(s):  COLONOSCOPY WITH POLYPECTOMY X3    Staff:  Surgeon(s):  Lilo Ferguson MD      Anesthesia: Monitored Anesthesia Care    Description of Procedure:  A description of the procedure as well as risks, benefits and alternative methods were explained to the patient who voiced understanding and signed the corresponding consent form. A physical exam was performed and vital signs were monitored throughout the procedure.    A rectal exam was performed which was normal. An Olympus colonoscope was placed into the rectum and proceeded under direct visualization through the colon until the cecum and appendiceal orifice were identified. Careful visualization occurred upon slow withdraw of the scope. The scope was then retroflexed and the distal rectum was visualized. The quality of the prep was good. The procedure was not difficult and there were no immediate complications.    The polyp was removed.  Scattered diverticulosis and hemorrhoids were noticed.  Polyps removed with a cold snare polypectomy technique.  Polyps removed from ascending transverse colon.    Impression:  1.  Polyps removed as detailed above.  2.  Diverticulosis  3.  Hemorrhoids    Recommendations:  Follow up with GI clinic as needed  Follow up with PCP as scheduled  Follow up with biopsy report  Repeat colonoscopy in 3 years  Benefiber 1 scoop 2x/day       Lilo Ferguson MD     Date: 8/15/2019    Time: 9:43 AM

## 2019-08-16 LAB
LAB AP CASE REPORT: NORMAL
PATH REPORT.FINAL DX SPEC: NORMAL
PATH REPORT.GROSS SPEC: NORMAL

## 2019-10-10 RX ORDER — DILTIAZEM HYDROCHLORIDE 120 MG/1
120 TABLET, FILM COATED ORAL DAILY
Qty: 90 TABLET | Refills: 3 | Status: SHIPPED | OUTPATIENT
Start: 2019-10-10 | End: 2019-10-15

## 2019-10-15 RX ORDER — DILTIAZEM HYDROCHLORIDE 120 MG/1
120 CAPSULE, COATED, EXTENDED RELEASE ORAL DAILY
Qty: 90 CAPSULE | Refills: 3 | Status: SHIPPED | OUTPATIENT
Start: 2019-10-15 | End: 2022-05-25

## 2019-11-12 RX ORDER — CARVEDILOL 12.5 MG/1
TABLET ORAL
Qty: 60 TABLET | Refills: 2 | Status: SHIPPED | OUTPATIENT
Start: 2019-11-12 | End: 2020-02-12

## 2020-01-08 ENCOUNTER — TRANSCRIBE ORDERS (OUTPATIENT)
Dept: ADMINISTRATIVE | Facility: HOSPITAL | Age: 60
End: 2020-01-08

## 2020-01-08 ENCOUNTER — OFFICE (OUTPATIENT)
Dept: URBAN - METROPOLITAN AREA CLINIC 64 | Facility: CLINIC | Age: 60
End: 2020-01-08
Payer: MEDICAID

## 2020-01-08 VITALS
HEIGHT: 68 IN | SYSTOLIC BLOOD PRESSURE: 109 MMHG | DIASTOLIC BLOOD PRESSURE: 76 MMHG | WEIGHT: 250 LBS | HEART RATE: 98 BPM

## 2020-01-08 DIAGNOSIS — B18.2 CHRONIC VIRAL HEPATITIS C: ICD-10-CM

## 2020-01-08 DIAGNOSIS — B18.2 CHRONIC HEPATITIS C WITH HEPATIC COMA (HCC): Primary | ICD-10-CM

## 2020-01-08 DIAGNOSIS — E11.9 TYPE 2 DIABETES MELLITUS WITHOUT COMPLICATIONS: ICD-10-CM

## 2020-01-08 DIAGNOSIS — I51.9 HEART DISEASE, UNSPECIFIED: ICD-10-CM

## 2020-01-08 DIAGNOSIS — Z12.39 SCREENING BREAST EXAMINATION: Primary | ICD-10-CM

## 2020-01-08 PROCEDURE — 99214 OFFICE O/P EST MOD 30 MIN: CPT | Performed by: INTERNAL MEDICINE

## 2020-01-08 RX ORDER — GLECAPREVIR AND PIBRENTASVIR 40; 100 MG/1; MG/1
TABLET, FILM COATED ORAL
Qty: 180 | Refills: 0 | Status: ACTIVE
Start: 2020-01-08

## 2020-01-16 ENCOUNTER — HOSPITAL ENCOUNTER (OUTPATIENT)
Dept: ULTRASOUND IMAGING | Facility: HOSPITAL | Age: 60
Discharge: HOME | End: 2020-01-16
Admitting: INTERNAL MEDICINE

## 2020-01-16 ENCOUNTER — HOSPITAL ENCOUNTER (OUTPATIENT)
Dept: MAMMOGRAPHY | Facility: HOSPITAL | Age: 60
Discharge: HOME | End: 2020-01-16
Admitting: FAMILY MEDICINE

## 2020-01-16 DIAGNOSIS — B18.2 CHRONIC HEPATITIS C WITH HEPATIC COMA (HCC): ICD-10-CM

## 2020-01-16 DIAGNOSIS — Z12.39 SCREENING BREAST EXAMINATION: ICD-10-CM

## 2020-01-16 PROCEDURE — 77067 SCR MAMMO BI INCL CAD: CPT

## 2020-01-16 PROCEDURE — 77063 BREAST TOMOSYNTHESIS BI: CPT

## 2020-01-16 PROCEDURE — 76705 ECHO EXAM OF ABDOMEN: CPT

## 2020-02-12 RX ORDER — CARVEDILOL 12.5 MG/1
TABLET ORAL
Qty: 60 TABLET | Refills: 2 | Status: SHIPPED | OUTPATIENT
Start: 2020-02-12 | End: 2020-05-07

## 2020-04-21 ENCOUNTER — OFFICE (OUTPATIENT)
Dept: URBAN - METROPOLITAN AREA CLINIC 64 | Facility: CLINIC | Age: 60
End: 2020-04-21

## 2020-04-21 VITALS — HEIGHT: 68 IN

## 2020-04-21 DIAGNOSIS — K59.00 CONSTIPATION, UNSPECIFIED: ICD-10-CM

## 2020-04-21 DIAGNOSIS — B18.2 CHRONIC VIRAL HEPATITIS C: ICD-10-CM

## 2020-04-21 DIAGNOSIS — K21.9 GASTRO-ESOPHAGEAL REFLUX DISEASE WITHOUT ESOPHAGITIS: ICD-10-CM

## 2020-04-21 PROCEDURE — 99213 OFFICE O/P EST LOW 20 MIN: CPT | Mod: 95 | Performed by: INTERNAL MEDICINE

## 2020-04-21 RX ORDER — OMEPRAZOLE 20 MG/1
20 CAPSULE, DELAYED RELEASE ORAL
Qty: 30 | Refills: 11 | Status: ACTIVE
Start: 2020-04-21

## 2020-04-21 RX ORDER — POLYETHYLENE GLYCOL 3350 17 G/17G
POWDER, FOR SOLUTION ORAL
Qty: 1 | Refills: 11 | Status: ACTIVE
Start: 2020-04-21

## 2020-05-07 RX ORDER — CARVEDILOL 12.5 MG/1
TABLET ORAL
Qty: 60 TABLET | Refills: 2 | Status: SHIPPED | OUTPATIENT
Start: 2020-05-07 | End: 2020-07-23 | Stop reason: SDUPTHER

## 2020-07-23 ENCOUNTER — TELEPHONE (OUTPATIENT)
Dept: CARDIOLOGY | Facility: CLINIC | Age: 60
End: 2020-07-23

## 2020-07-23 RX ORDER — CARVEDILOL 12.5 MG/1
12.5 TABLET ORAL 2 TIMES DAILY
Qty: 60 TABLET | Refills: 5 | Status: SHIPPED | OUTPATIENT
Start: 2020-07-23 | End: 2021-01-27

## 2021-01-27 RX ORDER — CARVEDILOL 12.5 MG/1
12.5 TABLET ORAL 2 TIMES DAILY
Qty: 60 TABLET | Refills: 5 | Status: SHIPPED | OUTPATIENT
Start: 2021-01-27 | End: 2021-07-23

## 2021-07-23 RX ORDER — CARVEDILOL 12.5 MG/1
12.5 TABLET ORAL 2 TIMES DAILY
Qty: 60 TABLET | Refills: 0 | Status: SHIPPED | OUTPATIENT
Start: 2021-07-23 | End: 2022-05-25

## 2021-08-23 RX ORDER — CARVEDILOL 12.5 MG/1
12.5 TABLET ORAL 2 TIMES DAILY
Qty: 60 TABLET | Refills: 0 | OUTPATIENT
Start: 2021-08-23

## 2022-05-20 PROBLEM — R06.09 DYSPNEA ON EXERTION: Status: ACTIVE | Noted: 2019-03-12

## 2022-05-20 PROBLEM — I10 HYPERTENSION: Status: ACTIVE | Noted: 2022-05-20

## 2022-05-20 PROBLEM — G47.30 SLEEP APNEA: Status: ACTIVE | Noted: 2019-05-01

## 2022-05-20 PROBLEM — F41.9 ANXIETY DISORDER: Status: ACTIVE | Noted: 2022-05-20

## 2022-05-20 PROBLEM — I27.20 PULMONARY HYPERTENSION (HCC): Status: ACTIVE | Noted: 2019-05-01

## 2022-05-20 PROBLEM — R07.9 CHEST PAIN: Status: ACTIVE | Noted: 2019-03-12

## 2022-05-20 PROBLEM — I10 BENIGN ESSENTIAL HTN: Status: ACTIVE | Noted: 2022-05-20

## 2022-05-20 PROBLEM — R60.0 EDEMA OF LOWER EXTREMITY: Status: ACTIVE | Noted: 2017-08-02

## 2022-05-20 PROBLEM — K21.9 GASTROESOPHAGEAL REFLUX DISEASE: Status: ACTIVE | Noted: 2022-05-20

## 2022-05-24 NOTE — PROGRESS NOTES
Date of Office Visit: 2022  Encounter Provider: Dr. Eugene Ordonez  Place of Service: [facility] [place]  Patient Name: Ana Griggs  :1960  Clay Watson MD    Chief Complaint   Patient presents with   • Atrial Fibrillation   • Cardiomyopathy   • Hypertension   • Consult   • Congestive Heart Failure     History of Present Illness:    I am pleased to see Mrs. Griggs in my office today as a new consultation.    As you know, patient is 61-year-old white female whose past medical history is significant for hypertension, COPD, chronic atrial fibrillation, cardiomyopathy, who came today to establish her cardiac care with me.    In 2017, patient was evaluated for her symptom of shortness of breath and palpitation.  Patient was noted to be in atrial fibrillation and her echocardiogram was noted to have cardiomyopathy.  Patient was started on rate control and anticoagulation.  It is unclear to me whether patient was ever attempted to have cardioversion by previous cardiologist.  Patient reports that she never had cardioversion.    In 2019, patient underwent echocardiogram which showed moderate left ventricular dysfunction with EF of 40%.  No significant valvular heart disease noted.  Stress test at that time showed no myocardial ischemia or infarction.      Patient reports that she is short of breath.  Patient has trace leg edema.  Patient complain of shortness of breath on exertion.  Patient denies any chest pain or tightness or heaviness.  Patient denies any orthopnea PND complain of palpitation.  Patient has mild cough.    Patient smokes a pack and a half of cigarettes.  Patient does not abuse alcohol.    At this stage, I would recommend to continue Bumex 2 mg daily.  I reviewed all the EKG available in the chart patient has been in documented atrial fibrillation since 2017 persistently.  I doubt that it will be possible to convert her into sinus rhythm currently.  Patient has chronic  persistent atrial fibrillation now.  I would recommend rate control.  Her blood pressure is borderline.  I will discontinue carvedilol and start Toprol-XL 50 mg daily.  I would proceed with echocardiogram.  If there is worsening of left ventricular dysfunction, patient may need ischemic evaluation.  I would also consider Entresto or ARB in future.          Past Medical History:   Diagnosis Date   • Anxiety    • Anxiety and depression    • Atrial fibrillation (HCC)     chronic   • Bronchitis, chronic (HCC)    • Cardiomegaly    • Cardiomyopathy (HCC)    • CHF (congestive heart failure) (HCC)    • COPD (chronic obstructive pulmonary disease) (HCC)    • Diabetes (HCC)    • GERD (gastroesophageal reflux disease)    • Hepatitis C    • HTN (hypertension)    • Neuropathy    • Numbness and tingling of both feet    • Pain    • Rheumatoid arthritis (HCC)          Past Surgical History:   Procedure Laterality Date   • BUNIONECTOMY Bilateral    • COLONOSCOPY N/A 8/15/2019    Procedure: COLONOSCOPY WITH POLYPECTOMY X3;  Surgeon: Lilo Ferguson MD;  Location: [facility];  Service: Gastroenterology   • DILATATION AND CURETTAGE Bilateral    • FEMUR FRACTURE SURGERY Left    • KNEE ARTHROSCOPY WITH OPEN LIGAMENT REPAIR Left    • POSTPARTUM TUBAL LIGATION Bilateral 1992           Current Outpatient Medications:   •  albuterol sulfate  (90 Base) MCG/ACT inhaler, PROAIR  (90 Base) MCG/ACT AERS, Disp: , Rfl:   •  ALPRAZolam (XANAX) 0.5 MG tablet, ALPRAZOLAM 0.5 MG TABS, Disp: , Rfl:   •  bumetanide (BUMEX) 2 MG tablet, BUMETANIDE 2 MG TABS, Disp: , Rfl:   •  digoxin (LANOXIN) 125 MCG tablet, Take 125 mcg by mouth Daily., Disp: , Rfl:   •  DULoxetine (CYMBALTA) 60 MG capsule, Take 60 mg by mouth Daily., Disp: , Rfl:   •  empagliflozin (JARDIANCE) 25 MG tablet tablet, JARDIANCE 25 MG TABS, Disp: , Rfl:   •  fentaNYL (DURAGESIC) 12 MCG/HR, Place 1 patch on the skin as directed by provider Every 72 (Seventy-Two) Hours.,  Disp: , Rfl:   •  loratadine (CLARITIN) 10 MG tablet, Daily., Disp: , Rfl:   •  melatonin 5 MG tablet tablet, Take 5 mg by mouth Every Night., Disp: , Rfl:   •  metFORMIN (GLUCOPHAGE) 500 MG tablet, Take 500 mg by mouth 2 (Two) Times a Day With Meals., Disp: , Rfl:   •  oxyCODONE-acetaminophen (PERCOCET)  MG per tablet, Take 1 tablet by mouth Every 6 (Six) Hours As Needed for Moderate Pain ., Disp: , Rfl:   •  potassium chloride (K-DUR,KLOR-CON) 20 MEQ CR tablet, Take 20 mEq by mouth Daily., Disp: , Rfl:   •  promethazine (PHENERGAN) 25 MG tablet, Take 25 mg by mouth 6 (Six) Times a Day., Disp: , Rfl:   •  rivaroxaban (XARELTO) 20 MG tablet, XARELTO 20 MG TABS, Disp: , Rfl:   •  metoprolol succinate XL (TOPROL-XL) 50 MG 24 hr tablet, Take 1 tablet by mouth Daily., Disp: 90 tablet, Rfl: 3      Social History     Socioeconomic History   • Marital status:    Tobacco Use   • Smoking status: Current Every Day Smoker     Packs/day: 1.50   • Smokeless tobacco: Never Used   Vaping Use   • Vaping Use: Never used   Substance and Sexual Activity   • Alcohol use: No   • Drug use: No   • Sexual activity: Defer         Review of Systems   Constitutional: Negative for chills and fever.   HENT: Negative for ear discharge and nosebleeds.    Eyes: Negative for discharge and redness.   Cardiovascular: Positive for palpitations. Negative for chest pain, orthopnea, paroxysmal nocturnal dyspnea and syncope.   Respiratory: Positive for shortness of breath. Negative for cough and wheezing.    Endocrine: Negative for heat intolerance.   Skin: Negative for rash.   Musculoskeletal: Positive for arthritis, back pain and joint pain. Negative for myalgias.   Gastrointestinal: Negative for abdominal pain, melena, nausea and vomiting.   Genitourinary: Negative for dysuria and hematuria.   Neurological: Negative for dizziness, light-headedness, numbness and tremors.   Psychiatric/Behavioral: Negative for depression. The patient is not  "nervous/anxious.        Procedures      ECG 12 Lead    Date/Time: 5/25/2022 4:32 PM  Performed by: Eugene Ordonez MD  Authorized by: Eugene Ordonez MD   Comparison: compared with previous ECG   Similar to previous ECG  Rhythm: atrial fibrillation    Clinical impression: abnormal EKG            ECG 12 Lead    (Results Pending)           Objective:    /76 (BP Location: Left arm, Patient Position: Sitting, Cuff Size: Large Adult)   Pulse 100   Ht 172.7 cm (67.99\")   Wt 111 kg (244 lb)   SpO2 97%   BMI 37.11 kg/m²         Constitutional:       Appearance: Well-developed.   Eyes:      General: No scleral icterus.        Right eye: No discharge.   HENT:      Head: Normocephalic and atraumatic.   Neck:      Thyroid: No thyromegaly.      Lymphadenopathy: No cervical adenopathy.   Pulmonary:      Effort: Pulmonary effort is normal. No respiratory distress.      Breath sounds: Normal breath sounds. No wheezing. No rales.   Cardiovascular:      Normal rate. Regular rhythm.      No gallop.   Abdominal:      Tenderness: There is no abdominal tenderness.   Skin:     Findings: No erythema or rash.   Neurological:      Mental Status: Alert and oriented to person, place, and time.             Assessment:       Diagnosis Plan   1. Benign essential HTN  ECG 12 Lead    metoprolol succinate XL (TOPROL-XL) 50 MG 24 hr tablet    Adult Transthoracic Echo Complete W/ Cont if Necessary Per Protocol   2. Cardiomyopathy, dilated (HCC)  ECG 12 Lead    metoprolol succinate XL (TOPROL-XL) 50 MG 24 hr tablet    Adult Transthoracic Echo Complete W/ Cont if Necessary Per Protocol   3. Congestive heart failure, unspecified HF chronicity, unspecified heart failure type (HCC)  ECG 12 Lead    metoprolol succinate XL (TOPROL-XL) 50 MG 24 hr tablet    Adult Transthoracic Echo Complete W/ Cont if Necessary Per Protocol   4. Paroxysmal atrial fibrillation (HCC)  ECG 12 Lead    metoprolol succinate XL (TOPROL-XL) 50 MG 24 hr tablet    Adult " Transthoracic Echo Complete W/ Cont if Necessary Per Protocol   5. Sleep apnea, unspecified type  Ambulatory Referral to Pulmonology    metoprolol succinate XL (TOPROL-XL) 50 MG 24 hr tablet    Adult Transthoracic Echo Complete W/ Cont if Necessary Per Protocol   6. COPD with acute exacerbation (HCC)  Ambulatory Referral to Pulmonology    metoprolol succinate XL (TOPROL-XL) 50 MG 24 hr tablet    Adult Transthoracic Echo Complete W/ Cont if Necessary Per Protocol            Plan:       MDM:    1.  Cardiomyopathy:    I would repeat echocardiogram.  If LV function deteriorated, patient may need cardiac catheterization.  Also consider Entresto in future    2.  Congestive heart failure systolic chronic:    Current treatment would be continued.    3.  Chronic persistent atrial fibrillation:    Heart rate is poorly controlled.  I would discontinue carvedilol and add Toprol-XL 50 mg daily    4.  Sleep apnea:    Patient is not on sleep patient has not been diagnosed with sleep apnea but she gives classic symptom I would refer to pulmonary for sleep study and COPD control and management    5.  Hypertension:    Blood pressure is controlled

## 2022-05-25 ENCOUNTER — OFFICE VISIT (OUTPATIENT)
Dept: CARDIOLOGY | Facility: CLINIC | Age: 62
End: 2022-05-25

## 2022-05-25 VITALS
HEIGHT: 68 IN | DIASTOLIC BLOOD PRESSURE: 76 MMHG | WEIGHT: 244 LBS | OXYGEN SATURATION: 97 % | HEART RATE: 100 BPM | BODY MASS INDEX: 36.98 KG/M2 | SYSTOLIC BLOOD PRESSURE: 118 MMHG

## 2022-05-25 DIAGNOSIS — I50.9 CONGESTIVE HEART FAILURE, UNSPECIFIED HF CHRONICITY, UNSPECIFIED HEART FAILURE TYPE: ICD-10-CM

## 2022-05-25 DIAGNOSIS — G47.30 SLEEP APNEA, UNSPECIFIED TYPE: ICD-10-CM

## 2022-05-25 DIAGNOSIS — I10 BENIGN ESSENTIAL HTN: Primary | ICD-10-CM

## 2022-05-25 DIAGNOSIS — J44.1 COPD WITH ACUTE EXACERBATION: ICD-10-CM

## 2022-05-25 DIAGNOSIS — I42.0 CARDIOMYOPATHY, DILATED: ICD-10-CM

## 2022-05-25 DIAGNOSIS — I48.0 PAROXYSMAL ATRIAL FIBRILLATION: ICD-10-CM

## 2022-05-25 PROBLEM — M51.36 DEGENERATION OF LUMBAR INTERVERTEBRAL DISC: Status: ACTIVE | Noted: 2021-03-02

## 2022-05-25 PROBLEM — M51.369 DEGENERATION OF LUMBAR INTERVERTEBRAL DISC: Status: ACTIVE | Noted: 2021-03-02

## 2022-05-25 PROBLEM — M47.816 LUMBAR SPONDYLOSIS: Status: ACTIVE | Noted: 2021-03-02

## 2022-05-25 PROCEDURE — 93000 ELECTROCARDIOGRAM COMPLETE: CPT | Performed by: INTERNAL MEDICINE

## 2022-05-25 PROCEDURE — 99204 OFFICE O/P NEW MOD 45 MIN: CPT | Performed by: INTERNAL MEDICINE

## 2022-05-25 RX ORDER — POTASSIUM CHLORIDE 20 MEQ/1
20 TABLET, EXTENDED RELEASE ORAL DAILY
COMMUNITY

## 2022-05-25 RX ORDER — OXYCODONE AND ACETAMINOPHEN 10; 325 MG/1; MG/1
1 TABLET ORAL EVERY 6 HOURS PRN
COMMUNITY

## 2022-05-25 RX ORDER — FENTANYL 12 UG/H
1 PATCH TRANSDERMAL
COMMUNITY

## 2022-05-25 RX ORDER — DULOXETIN HYDROCHLORIDE 60 MG/1
60 CAPSULE, DELAYED RELEASE ORAL DAILY
COMMUNITY

## 2022-05-25 RX ORDER — METOPROLOL SUCCINATE 50 MG/1
50 TABLET, EXTENDED RELEASE ORAL DAILY
Qty: 90 TABLET | Refills: 3 | Status: SHIPPED | OUTPATIENT
Start: 2022-05-25 | End: 2022-07-20 | Stop reason: SDUPTHER

## 2022-06-03 ENCOUNTER — TELEPHONE (OUTPATIENT)
Dept: CARDIOLOGY | Facility: CLINIC | Age: 62
End: 2022-06-03

## 2022-06-03 RX ORDER — DILTIAZEM HYDROCHLORIDE 120 MG/1
120 CAPSULE, COATED, EXTENDED RELEASE ORAL DAILY
COMMUNITY
End: 2023-03-06

## 2022-06-03 RX ORDER — ASPIRIN 81 MG/1
81 TABLET, CHEWABLE ORAL DAILY
COMMUNITY

## 2022-06-03 NOTE — TELEPHONE ENCOUNTER
Patient called stating she saw that aspirin and diltiazem was discontinued on her visit with Dr. Ordonez on 5/25. She says she is still taking these and wants to make sure that is what she is suppose to be doing?

## 2022-06-27 ENCOUNTER — HOSPITAL ENCOUNTER (OUTPATIENT)
Dept: CARDIOLOGY | Facility: HOSPITAL | Age: 62
Discharge: HOME | End: 2022-06-27
Admitting: INTERNAL MEDICINE

## 2022-06-27 VITALS — WEIGHT: 244 LBS | HEIGHT: 67 IN | BODY MASS INDEX: 38.3 KG/M2

## 2022-06-27 DIAGNOSIS — I42.0 CARDIOMYOPATHY, DILATED: ICD-10-CM

## 2022-06-27 DIAGNOSIS — G47.30 SLEEP APNEA, UNSPECIFIED TYPE: ICD-10-CM

## 2022-06-27 DIAGNOSIS — J44.1 COPD WITH ACUTE EXACERBATION: ICD-10-CM

## 2022-06-27 DIAGNOSIS — I50.9 CONGESTIVE HEART FAILURE, UNSPECIFIED HF CHRONICITY, UNSPECIFIED HEART FAILURE TYPE: ICD-10-CM

## 2022-06-27 DIAGNOSIS — I10 BENIGN ESSENTIAL HTN: ICD-10-CM

## 2022-06-27 DIAGNOSIS — I48.0 PAROXYSMAL ATRIAL FIBRILLATION: ICD-10-CM

## 2022-06-27 PROCEDURE — 93306 TTE W/DOPPLER COMPLETE: CPT

## 2022-06-27 PROCEDURE — 93306 TTE W/DOPPLER COMPLETE: CPT | Performed by: INTERNAL MEDICINE

## 2022-07-01 LAB
BH CV ECHO MEAS - ACS: 2.02 CM
BH CV ECHO MEAS - AO MAX PG: 4.2 MMHG
BH CV ECHO MEAS - AO MEAN PG: 2.11 MMHG
BH CV ECHO MEAS - AO ROOT DIAM: 3.3 CM
BH CV ECHO MEAS - AO V2 MAX: 101.9 CM/SEC
BH CV ECHO MEAS - AO V2 VTI: 16.6 CM
BH CV ECHO MEAS - AVA(I,D): 1.88 CM2
BH CV ECHO MEAS - EDV(CUBED): 81.5 ML
BH CV ECHO MEAS - EDV(MOD-SP4): 58.6 ML
BH CV ECHO MEAS - EF(MOD-SP4): 53.3 %
BH CV ECHO MEAS - ESV(CUBED): 33.8 ML
BH CV ECHO MEAS - ESV(MOD-SP4): 27.4 ML
BH CV ECHO MEAS - FS: 25.4 %
BH CV ECHO MEAS - IVS/LVPW: 0.87 CM
BH CV ECHO MEAS - IVSD: 0.9 CM
BH CV ECHO MEAS - LA DIMENSION: 4.3 CM
BH CV ECHO MEAS - LV MASS(C)D: 137.7 GRAMS
BH CV ECHO MEAS - LV MAX PG: 1.9 MMHG
BH CV ECHO MEAS - LV MEAN PG: 0.94 MMHG
BH CV ECHO MEAS - LV V1 MAX: 68.9 CM/SEC
BH CV ECHO MEAS - LV V1 VTI: 11.2 CM
BH CV ECHO MEAS - LVIDD: 4.3 CM
BH CV ECHO MEAS - LVIDS: 3.2 CM
BH CV ECHO MEAS - LVOT AREA: 2.8 CM2
BH CV ECHO MEAS - LVOT DIAM: 1.89 CM
BH CV ECHO MEAS - LVPWD: 1.03 CM
BH CV ECHO MEAS - MV E MAX VEL: 96.3 CM/SEC
BH CV ECHO MEAS - MV MAX PG: 6.6 MMHG
BH CV ECHO MEAS - MV MEAN PG: 4 MMHG
BH CV ECHO MEAS - MV V2 VTI: 14 CM
BH CV ECHO MEAS - MVA(VTI): 2.23 CM2
BH CV ECHO MEAS - RAP SYSTOLE: 8 MMHG
BH CV ECHO MEAS - RVDD: 4.7 CM
BH CV ECHO MEAS - RVSP: 44.3 MMHG
BH CV ECHO MEAS - SV(LVOT): 31.2 ML
BH CV ECHO MEAS - SV(MOD-SP4): 31.2 ML
BH CV ECHO MEAS - TR MAX PG: 36.3 MMHG
BH CV ECHO MEAS - TR MAX VEL: 300.5 CM/SEC
MAXIMAL PREDICTED HEART RATE: 160 BPM
STRESS TARGET HR: 136 BPM

## 2022-07-05 ENCOUNTER — TELEPHONE (OUTPATIENT)
Dept: CARDIOLOGY | Facility: CLINIC | Age: 62
End: 2022-07-05

## 2022-07-19 NOTE — PROGRESS NOTES
Date of Office Visit: 2022  Encounter Provider: Dr. Eugene Ordonez  Place of Service: [facility] [place]  Patient Name: Ana Griggs  :1960  Clay Watson MD    Chief Complaint   Patient presents with   • Atrial Fibrillation   • Cardiomyopathy   • Congestive Heart Failure   • Hypertension   • Diabetes   • Follow-up     History of Present Illness    I am pleased to see Mrs. Griggs in my office today as a follow-up    As you know, patient is 61-year-old white female whose past medical history is significant for hypertension, COPD, chronic atrial fibrillation, cardiomyopathy, who came today for follow-up.    In , patient was evaluated for her symptom of shortness of breath and palpitation.  Patient was noted to be in atrial fibrillation and her echocardiogram was noted to have cardiomyopathy.  Patient was started on rate control and anticoagulation.  It is unclear to me whether patient was ever attempted to have cardioversion by previous cardiologist.  Patient reports that she never had cardioversion.    In , patient underwent echocardiogram which showed moderate left ventricular dysfunction with EF of 40%.  No significant valvular heart disease noted.  Stress test at that time showed no myocardial ischemia or infarction.    On previous visit, I recommended to start Toprol-XL 50 mg daily.  Carvedilol was stopped.  Her heart rate is better controlled but still on the higher side.  She is feeling better.  Patient denies any chest pain or tightness or heaviness.  No leg edema noted.    EKG showed atrial fibrillation with heart rate of 107 bpm.    In 2022, echocardiogram showed mild left ventricular dysfunction with EF of 45 to 50%.    At this stage I would recommend to increase Toprol-XL to 50 mg twice daily.  I would continue Xarelto.  Her previous stress test was negative.  Patient has been noted to have persistent atrial fibrillation since .  I would consider  Entresto in future continue monitoring the blood pressure currently.        Past Medical History:   Diagnosis Date   • Anxiety    • Anxiety and depression    • Atrial fibrillation (HCC)     chronic   • Bronchitis, chronic (HCC)    • Cardiomegaly    • Cardiomyopathy (HCC)    • CHF (congestive heart failure) (HCC)    • COPD (chronic obstructive pulmonary disease) (HCC)    • Diabetes (HCC)    • GERD (gastroesophageal reflux disease)    • Hepatitis C    • HTN (hypertension)    • Neuropathy    • Numbness and tingling of both feet    • Pain    • Rheumatoid arthritis (HCC)          Past Surgical History:   Procedure Laterality Date   • BUNIONECTOMY Bilateral    • COLONOSCOPY N/A 8/15/2019    Procedure: COLONOSCOPY WITH POLYPECTOMY X3;  Surgeon: Lilo Ferguson MD;  Location: [facility];  Service: Gastroenterology   • DILATATION AND CURETTAGE Bilateral    • FEMUR FRACTURE SURGERY Left    • KNEE ARTHROSCOPY WITH OPEN LIGAMENT REPAIR Left    • POSTPARTUM TUBAL LIGATION Bilateral 1992           Current Outpatient Medications:   •  albuterol sulfate  (90 Base) MCG/ACT inhaler, PROAIR  (90 Base) MCG/ACT AERS, Disp: , Rfl:   •  ALPRAZolam (XANAX) 0.5 MG tablet, ALPRAZOLAM 0.5 MG TABS, Disp: , Rfl:   •  aspirin 81 MG chewable tablet, Chew 81 mg Daily., Disp: , Rfl:   •  bumetanide (BUMEX) 2 MG tablet, BUMETANIDE 2 MG TABS, Disp: , Rfl:   •  digoxin (LANOXIN) 125 MCG tablet, Take 125 mcg by mouth Daily., Disp: , Rfl:   •  dilTIAZem CD (CARDIZEM CD) 120 MG 24 hr capsule, Take 120 mg by mouth Daily., Disp: , Rfl:   •  DULoxetine (CYMBALTA) 60 MG capsule, Take 60 mg by mouth Daily., Disp: , Rfl:   •  empagliflozin (JARDIANCE) 25 MG tablet tablet, JARDIANCE 25 MG TABS, Disp: , Rfl:   •  fentaNYL (DURAGESIC) 12 MCG/HR, Place 1 patch on the skin as directed by provider Every 72 (Seventy-Two) Hours., Disp: , Rfl:   •  Fluticasone-Umeclidin-Vilant (Trelegy Ellipta) 200-62.5-25 MCG/INH inhaler, Inhale 1 puff Daily., Disp:  , Rfl:   •  loratadine (CLARITIN) 10 MG tablet, Daily., Disp: , Rfl:   •  metFORMIN (GLUCOPHAGE) 500 MG tablet, Take 500 mg by mouth 2 (Two) Times a Day With Meals., Disp: , Rfl:   •  metoprolol succinate XL (TOPROL-XL) 50 MG 24 hr tablet, Take 1 tablet by mouth 2 (Two) Times a Day., Disp: 180 tablet, Rfl: 1  •  oxyCODONE-acetaminophen (PERCOCET)  MG per tablet, Take 1 tablet by mouth Every 6 (Six) Hours As Needed for Moderate Pain ., Disp: , Rfl:   •  potassium chloride (K-DUR,KLOR-CON) 20 MEQ CR tablet, Take 20 mEq by mouth Daily., Disp: , Rfl:   •  promethazine (PHENERGAN) 25 MG tablet, Take 25 mg by mouth 6 (Six) Times a Day., Disp: , Rfl:   •  rivaroxaban (XARELTO) 20 MG tablet, XARELTO 20 MG TABS, Disp: , Rfl:       Social History     Socioeconomic History   • Marital status:    Tobacco Use   • Smoking status: Current Every Day Smoker     Packs/day: 1.50   • Smokeless tobacco: Never Used   • Tobacco comment: Patient is advised to quit smoking   Vaping Use   • Vaping Use: Never used   Substance and Sexual Activity   • Alcohol use: No   • Drug use: No   • Sexual activity: Defer         Review of Systems   Constitutional: Negative for chills and fever.   HENT: Negative for ear discharge and nosebleeds.    Eyes: Negative for discharge and redness.   Cardiovascular: Positive for palpitations. Negative for chest pain, orthopnea, paroxysmal nocturnal dyspnea and syncope.   Respiratory: Positive for shortness of breath. Negative for cough and wheezing.    Endocrine: Negative for heat intolerance.   Skin: Negative for rash.   Musculoskeletal: Positive for arthritis and joint pain. Negative for myalgias.   Gastrointestinal: Negative for abdominal pain, melena, nausea and vomiting.   Genitourinary: Negative for dysuria and hematuria.   Neurological: Negative for dizziness, light-headedness, numbness and tremors.   Psychiatric/Behavioral: Negative for depression. The patient is not nervous/anxious.   "      Procedures      ECG 12 Lead    Date/Time: 7/20/2022 4:54 PM  Performed by: Eugene Ordonez MD  Authorized by: Eugene Ordonez MD   Comparison: compared with previous ECG   Similar to previous ECG  Rhythm: atrial fibrillation    Clinical impression: abnormal EKG            ECG 12 Lead    (Results Pending)           Objective:    /78 (BP Location: Left arm, Patient Position: Sitting, Cuff Size: Large Adult)   Pulse 107   Ht 170.2 cm (67.01\")   Wt 107 kg (235 lb)   SpO2 99%   BMI 36.80 kg/m²         Constitutional:       Appearance: Well-developed.   Eyes:      General: No scleral icterus.        Right eye: No discharge.   HENT:      Head: Normocephalic and atraumatic.   Neck:      Thyroid: No thyromegaly.      Lymphadenopathy: No cervical adenopathy.   Pulmonary:      Effort: Pulmonary effort is normal. No respiratory distress.      Breath sounds: Normal breath sounds. No wheezing. No rales.   Cardiovascular:      Normal rate. Irregularly irregular rhythm.      No gallop.   Abdominal:      Tenderness: There is no abdominal tenderness.   Skin:     Findings: No erythema or rash.   Neurological:      Mental Status: Alert and oriented to person, place, and time.             Assessment:       Diagnosis Plan   1. Benign essential HTN  ECG 12 Lead    metoprolol succinate XL (TOPROL-XL) 50 MG 24 hr tablet   2. Cardiomyopathy, dilated (HCC)  ECG 12 Lead    metoprolol succinate XL (TOPROL-XL) 50 MG 24 hr tablet   3. Congestive heart failure, unspecified HF chronicity, unspecified heart failure type (HCC)  ECG 12 Lead    metoprolol succinate XL (TOPROL-XL) 50 MG 24 hr tablet   4. Paroxysmal atrial fibrillation (HCC)  ECG 12 Lead    metoprolol succinate XL (TOPROL-XL) 50 MG 24 hr tablet   5. Type 2 diabetes mellitus without complication, unspecified whether long term insulin use (HCC)  ECG 12 Lead   6. Sleep apnea, unspecified type  metoprolol succinate XL (TOPROL-XL) 50 MG 24 hr tablet   7. COPD with acute " exacerbation (HCC)  metoprolol succinate XL (TOPROL-XL) 50 MG 24 hr tablet            Plan:       MDM:    1.  Chronic atrial fibrillation:    Heart rate is much better than before.  I would recommend that patient should proceed with Toprol-XL 50 mg twice daily.  Continue Xarelto    2.  Dilated cardiomyopathy:    Recent echocardiogram showed improvement in EF.  Continue to observe.  I would recommend Entresto in future if blood pressure is stable    3.  Hypertension:    Blood pressure is within desirable range    4.  Diabetes mellitus:    Patient is on metformin.  Current treatment would be continued

## 2022-07-20 ENCOUNTER — OFFICE VISIT (OUTPATIENT)
Dept: CARDIOLOGY | Facility: CLINIC | Age: 62
End: 2022-07-20

## 2022-07-20 VITALS
OXYGEN SATURATION: 99 % | BODY MASS INDEX: 36.88 KG/M2 | HEIGHT: 67 IN | DIASTOLIC BLOOD PRESSURE: 78 MMHG | SYSTOLIC BLOOD PRESSURE: 120 MMHG | HEART RATE: 107 BPM | WEIGHT: 235 LBS

## 2022-07-20 DIAGNOSIS — I10 BENIGN ESSENTIAL HTN: Primary | ICD-10-CM

## 2022-07-20 DIAGNOSIS — J44.1 COPD WITH ACUTE EXACERBATION: ICD-10-CM

## 2022-07-20 DIAGNOSIS — I50.9 CONGESTIVE HEART FAILURE, UNSPECIFIED HF CHRONICITY, UNSPECIFIED HEART FAILURE TYPE: ICD-10-CM

## 2022-07-20 DIAGNOSIS — G47.30 SLEEP APNEA, UNSPECIFIED TYPE: ICD-10-CM

## 2022-07-20 DIAGNOSIS — I48.0 PAROXYSMAL ATRIAL FIBRILLATION: ICD-10-CM

## 2022-07-20 DIAGNOSIS — E11.9 TYPE 2 DIABETES MELLITUS WITHOUT COMPLICATION, UNSPECIFIED WHETHER LONG TERM INSULIN USE: ICD-10-CM

## 2022-07-20 DIAGNOSIS — I42.0 CARDIOMYOPATHY, DILATED: ICD-10-CM

## 2022-07-20 PROCEDURE — 99214 OFFICE O/P EST MOD 30 MIN: CPT | Performed by: INTERNAL MEDICINE

## 2022-07-20 PROCEDURE — 93000 ELECTROCARDIOGRAM COMPLETE: CPT | Performed by: INTERNAL MEDICINE

## 2022-07-20 RX ORDER — METOPROLOL SUCCINATE 50 MG/1
50 TABLET, EXTENDED RELEASE ORAL 2 TIMES DAILY
Qty: 180 TABLET | Refills: 1 | Status: SHIPPED | OUTPATIENT
Start: 2022-07-20 | End: 2023-01-10 | Stop reason: SDUPTHER

## 2022-07-20 RX ORDER — METOPROLOL SUCCINATE 50 MG/1
50 TABLET, EXTENDED RELEASE ORAL DAILY
Qty: 90 TABLET | Refills: 3 | Status: SHIPPED | OUTPATIENT
Start: 2022-07-20 | End: 2022-07-20 | Stop reason: SDUPTHER

## 2022-08-11 ENCOUNTER — OFFICE VISIT (OUTPATIENT)
Dept: PULMONOLOGY | Facility: HOSPITAL | Age: 62
End: 2022-08-11

## 2022-08-11 VITALS
BODY MASS INDEX: 37.2 KG/M2 | OXYGEN SATURATION: 94 % | SYSTOLIC BLOOD PRESSURE: 106 MMHG | RESPIRATION RATE: 16 BRPM | DIASTOLIC BLOOD PRESSURE: 73 MMHG | HEIGHT: 67 IN | WEIGHT: 237 LBS | HEART RATE: 83 BPM

## 2022-08-11 DIAGNOSIS — Z87.891 PERSONAL HISTORY OF NICOTINE DEPENDENCE: ICD-10-CM

## 2022-08-11 DIAGNOSIS — J44.9 CHRONIC OBSTRUCTIVE PULMONARY DISEASE, UNSPECIFIED COPD TYPE: Primary | ICD-10-CM

## 2022-08-11 DIAGNOSIS — G47.33 OSA (OBSTRUCTIVE SLEEP APNEA): ICD-10-CM

## 2022-08-11 PROCEDURE — G0463 HOSPITAL OUTPT CLINIC VISIT: HCPCS

## 2022-08-11 RX ORDER — CHOLECALCIFEROL (VITAMIN D3) 125 MCG
1 CAPSULE ORAL DAILY
COMMUNITY
Start: 2022-08-09

## 2022-08-11 NOTE — PROGRESS NOTES
PULMONARY/ CRITICAL CARE/ SLEEP MEDICINE OUTPATIENT CONSULT/ FOLLOW UP NOTE        Patient Name:  Ana Griggs    :  1960    Medical Record:  [id number]    PRIMARY CARE PHYSICIAN     Clay Watson MD    REASON FOR CONSULTATION    Ana Griggs is a 62 y.o. female who is referred for consultation for COPD  REVIEW OF SYSTEMS    Constitutional:  Denies fever or chills   Eyes:  Denies change in visual acuity   HENT:  Denies nasal congestion or sore throat   Respiratory:  Denies cough or shortness of breath   Cardiovascular:  Denies chest pain or edema   GI:  Denies abdominal pain, nausea, vomiting, bloody stools or diarrhea   :  Denies dysuria   Musculoskeletal:  Denies back pain or joint pain   Integument:  Denies rash   Neurologic:  Denies headache, focal weakness or sensory changes   Endocrine:  Denies polyuria or polydipsia   Lymphatic:  Denies swollen glands   Psychiatric:  Denies depression or anxiety     MEDICAL HISTORY    Past Medical History:   Diagnosis Date   • Anxiety    • Anxiety and depression    • Atrial fibrillation (HCC)     chronic   • Bronchitis, chronic (HCC)    • Cardiomegaly    • Cardiomyopathy (HCC)    • CHF (congestive heart failure) (HCC)    • COPD (chronic obstructive pulmonary disease) (HCC)    • Diabetes (HCC)    • GERD (gastroesophageal reflux disease)    • Hepatitis C    • HTN (hypertension)    • Neuropathy    • Numbness and tingling of both feet    • Pain    • Rheumatoid arthritis (HCC)         SURGICAL HISTORY    Past Surgical History:   Procedure Laterality Date   • BUNIONECTOMY Bilateral    • COLONOSCOPY N/A 8/15/2019    Procedure: COLONOSCOPY WITH POLYPECTOMY X3;  Surgeon: Lilo Ferguson MD;  Location: [facility] ENDOSCOPY;  Service: Gastroenterology   • DILATATION AND CURETTAGE Bilateral    • FEMUR FRACTURE SURGERY Left    • KNEE ARTHROSCOPY WITH OPEN LIGAMENT REPAIR Left    • POSTPARTUM TUBAL LIGATION Bilateral         FAMILY HISTORY    Family History   Problem  Relation Age of Onset   • No Known Problems Mother    • No Known Problems Father    • Breast cancer Sister        SOCIAL HISTORY    Social History     Tobacco Use   • Smoking status: Current Every Day Smoker     Packs/day: 1.50     Years: 45.00     Pack years: 67.50     Types: Cigarettes     Start date: 1/1/1977   • Smokeless tobacco: Never Used   • Tobacco comment: Still smoking   Substance Use Topics   • Alcohol use: No        ALLERGIES    Allergies   Allergen Reactions   • Medrol [Methylprednisolone] Swelling     Dose Julio only         MEDICATIONS    Current Outpatient Medications on File Prior to Visit   Medication Sig Dispense Refill   • albuterol sulfate  (90 Base) MCG/ACT inhaler PROAIR  (90 Base) MCG/ACT AERS     • ALPRAZolam (XANAX) 0.5 MG tablet ALPRAZOLAM 0.5 MG TABS     • aspirin 81 MG chewable tablet Chew 81 mg Daily.     • bumetanide (BUMEX) 2 MG tablet BUMETANIDE 2 MG TABS     • Cholecalciferol (Vitamin D3) 50 MCG (2000 UT) tablet Take 1 tablet by mouth Daily.     • digoxin (LANOXIN) 125 MCG tablet Take 125 mcg by mouth Daily.     • dilTIAZem CD (CARDIZEM CD) 120 MG 24 hr capsule Take 120 mg by mouth Daily.     • DULoxetine (CYMBALTA) 60 MG capsule Take 60 mg by mouth Daily.     • empagliflozin (JARDIANCE) 25 MG tablet tablet JARDIANCE 25 MG TABS     • fentaNYL (DURAGESIC) 12 MCG/HR Place 1 patch on the skin as directed by provider Every 72 (Seventy-Two) Hours.     • Fluticasone-Umeclidin-Vilant (Trelegy Ellipta) 200-62.5-25 MCG/INH inhaler Inhale 1 puff Daily.     • loratadine (CLARITIN) 10 MG tablet Daily.     • metFORMIN (GLUCOPHAGE) 500 MG tablet Take 500 mg by mouth 2 (Two) Times a Day With Meals.     • metoprolol succinate XL (TOPROL-XL) 50 MG 24 hr tablet Take 1 tablet by mouth 2 (Two) Times a Day. 180 tablet 1   • oxyCODONE-acetaminophen (PERCOCET)  MG per tablet Take 1 tablet by mouth Every 6 (Six) Hours As Needed for Moderate Pain .     • potassium chloride  "(K-DUR,KLOR-CON) 20 MEQ CR tablet Take 20 mEq by mouth Daily.     • promethazine (PHENERGAN) 25 MG tablet Take 25 mg by mouth 6 (Six) Times a Day.     • rivaroxaban (XARELTO) 20 MG tablet XARELTO 20 MG TABS       No current facility-administered medications on file prior to visit.       PHYSICAL EXAM    Vitals:    08/11/22 1331   BP: 106/73   BP Location: Left arm   Patient Position: Sitting   Cuff Size: Adult   Pulse: 83   Resp: 16   SpO2: 94%   Weight: 108 kg (237 lb)   Height: 170.2 cm (67\")        Constitutional:  Well developed, well nourished, no acute distress, non-toxic appearance   Eyes:  PERRL, conjunctiva normal   HENT:  Atraumatic, external ears normal, nose normal, oropharynx moist, no pharyngeal exudates. mallampatti   Neck- normal range of motion, no tenderness, supple   Respiratory:  No respiratory distress, normal breath sounds, no rales, no wheezing   Cardiovascular:  Normal rate, normal rhythm, no murmurs, no gallops, no rubs   GI:  Soft, nondistended, normal bowel sounds, nontender, no organomegaly, no mass, no rebound, no guarding   :  No costovertebral angle tenderness   Musculoskeletal:  No edema, no tenderness, no deformities. Back- no tenderness  Integument:  Well hydrated, no rash   Lymphatic:  No lymphadenopathy noted   Neurologic:  Alert & oriented x 3, CN 2-12 normal, normal motor function, normal sensory function, no focal deficits noted   Psychiatric:  Speech and behavior appropriate     No radiology results for the last 90 days.   Results for orders placed during the hospital encounter of 06/27/22    Adult Transthoracic Echo Complete W/ Cont if Necessary Per Protocol    Interpretation Summary  · Left ventricular systolic function is mildly decreased.  · Left ventricular ejection fraction is 45 to 50%  · Left ventricular wall thickness is consistent with borderline concentric hypertrophy.  · Left ventricular diastolic function was normal.  · The right ventricular cavity is moderately " dilated.  · The right atrial cavity is moderately dilated.      ASSESSMENT & PLAN:      62-year-old female with 40 pack year history of smoking progressive shortness of breath, features of obstructive sleep apnea with loud snoring excessive daytime sleepiness and fatigue  On exam diminished breath sounds with trace edema in the legs with some skin markings and erythema    Plan  CT scan of the chest lung cancer screening  PFTs with 6-minute walk  Home sleep study  Smoking cessation  Continue trelegy, as needed albuterol      This document has been electronically signed by  Maciel Cardenas MD  13:42 EDT

## 2022-08-12 DIAGNOSIS — Z01.812 PRE-PROCEDURE LAB EXAM: Primary | ICD-10-CM

## 2022-09-09 ENCOUNTER — APPOINTMENT (OUTPATIENT)
Dept: RESPIRATORY THERAPY | Facility: HOSPITAL | Age: 62
End: 2022-09-09

## 2022-10-10 ENCOUNTER — LAB (OUTPATIENT)
Dept: LAB | Facility: HOSPITAL | Age: 62
End: 2022-10-10

## 2022-10-10 ENCOUNTER — HOSPITAL ENCOUNTER (OUTPATIENT)
Dept: SLEEP MEDICINE | Facility: HOSPITAL | Age: 62
Discharge: HOME | End: 2022-10-10
Admitting: INTERNAL MEDICINE

## 2022-10-10 DIAGNOSIS — G47.33 OSA (OBSTRUCTIVE SLEEP APNEA): ICD-10-CM

## 2022-10-10 DIAGNOSIS — Z01.812 PRE-PROCEDURE LAB EXAM: ICD-10-CM

## 2022-10-10 LAB — SARS-COV-2 ORF1AB RESP QL NAA+PROBE: NOT DETECTED

## 2022-10-10 PROCEDURE — U0004 COV-19 TEST NON-CDC HGH THRU: HCPCS

## 2022-10-10 PROCEDURE — C9803 HOPD COVID-19 SPEC COLLECT: HCPCS

## 2022-10-10 PROCEDURE — 95806 SLEEP STUDY UNATT&RESP EFFT: CPT

## 2022-10-10 PROCEDURE — U0005 INFEC AGEN DETEC AMPLI PROBE: HCPCS

## 2022-10-12 ENCOUNTER — HOSPITAL ENCOUNTER (OUTPATIENT)
Dept: CT IMAGING | Facility: HOSPITAL | Age: 62
Discharge: HOME | End: 2022-10-12

## 2022-10-12 ENCOUNTER — HOSPITAL ENCOUNTER (OUTPATIENT)
Dept: RESPIRATORY THERAPY | Facility: HOSPITAL | Age: 62
Discharge: HOME | End: 2022-10-12

## 2022-10-12 VITALS — OXYGEN SATURATION: 96 % | RESPIRATION RATE: 12 BRPM | HEART RATE: 84 BPM

## 2022-10-12 DIAGNOSIS — J44.9 CHRONIC OBSTRUCTIVE PULMONARY DISEASE, UNSPECIFIED COPD TYPE: ICD-10-CM

## 2022-10-12 DIAGNOSIS — Z87.891 PERSONAL HISTORY OF NICOTINE DEPENDENCE: ICD-10-CM

## 2022-10-12 PROCEDURE — 94060 EVALUATION OF WHEEZING: CPT

## 2022-10-12 PROCEDURE — A9270 NON-COVERED ITEM OR SERVICE: HCPCS | Performed by: INTERNAL MEDICINE

## 2022-10-12 PROCEDURE — 63710000001 ALBUTEROL SULFATE HFA 108 (90 BASE) MCG/ACT AEROSOL SOLUTION 6.7 G INHALER: Performed by: INTERNAL MEDICINE

## 2022-10-12 PROCEDURE — 94726 PLETHYSMOGRAPHY LUNG VOLUMES: CPT

## 2022-10-12 PROCEDURE — 71271 CT THORAX LUNG CANCER SCR C-: CPT

## 2022-10-12 PROCEDURE — 94729 DIFFUSING CAPACITY: CPT

## 2022-10-12 RX ORDER — ALBUTEROL SULFATE 90 UG/1
2 AEROSOL, METERED RESPIRATORY (INHALATION) ONCE
Status: COMPLETED | OUTPATIENT
Start: 2022-10-12 | End: 2022-10-12

## 2022-10-12 RX ADMIN — ALBUTEROL SULFATE 2 PUFF: 108 INHALANT RESPIRATORY (INHALATION) at 13:33

## 2022-11-21 ENCOUNTER — OFFICE VISIT (OUTPATIENT)
Dept: PULMONOLOGY | Facility: HOSPITAL | Age: 62
End: 2022-11-21

## 2022-11-21 VITALS
HEART RATE: 99 BPM | OXYGEN SATURATION: 94 % | HEIGHT: 67 IN | WEIGHT: 239.2 LBS | TEMPERATURE: 98.2 F | DIASTOLIC BLOOD PRESSURE: 87 MMHG | RESPIRATION RATE: 17 BRPM | BODY MASS INDEX: 37.54 KG/M2 | SYSTOLIC BLOOD PRESSURE: 123 MMHG

## 2022-11-21 DIAGNOSIS — J44.9 CHRONIC OBSTRUCTIVE PULMONARY DISEASE, UNSPECIFIED COPD TYPE: Primary | ICD-10-CM

## 2022-11-21 PROCEDURE — G0463 HOSPITAL OUTPT CLINIC VISIT: HCPCS

## 2022-11-21 NOTE — PROGRESS NOTES
PULMONARY/ CRITICAL CARE/ SLEEP MEDICINE OUTPATIENT CONSULT/ FOLLOW UP NOTE        Patient Name:  Ana Griggs    :  1960    Medical Record:  [id number]    PRIMARY CARE PHYSICIAN     Clay Watson MD    REASON FOR CONSULTATION    Ana Griggs is a 62 y.o. female who is referred for consultation for dyspnea  REVIEW OF SYSTEMS    Constitutional:  Denies fever or chills   Eyes:  Denies change in visual acuity   HENT:  Denies nasal congestion or sore throat   Respiratory:  Denies cough or shortness of breath   Cardiovascular:  Denies chest pain or edema   GI:  Denies abdominal pain, nausea, vomiting, bloody stools or diarrhea   :  Denies dysuria   Musculoskeletal:  Denies back pain or joint pain   Integument:  Denies rash   Neurologic:  Denies headache, focal weakness or sensory changes   Endocrine:  Denies polyuria or polydipsia   Lymphatic:  Denies swollen glands   Psychiatric:  Denies depression or anxiety     MEDICAL HISTORY    Past Medical History:   Diagnosis Date   • Anxiety    • Anxiety and depression    • Atrial fibrillation (HCC)     chronic   • Bronchitis, chronic (HCC)    • Cardiomegaly    • Cardiomyopathy (HCC)    • CHF (congestive heart failure) (HCC)    • COPD (chronic obstructive pulmonary disease) (HCC)    • Diabetes (HCC)    • GERD (gastroesophageal reflux disease)    • Hepatitis C    • HTN (hypertension)    • Neuropathy    • Numbness and tingling of both feet    • Pain    • Rheumatoid arthritis (HCC)         SURGICAL HISTORY    Past Surgical History:   Procedure Laterality Date   • BUNIONECTOMY Bilateral    • COLONOSCOPY N/A 8/15/2019    Procedure: COLONOSCOPY WITH POLYPECTOMY X3;  Surgeon: Lilo Ferguson MD;  Location: [facility] ENDOSCOPY;  Service: Gastroenterology   • DILATATION AND CURETTAGE Bilateral    • FEMUR FRACTURE SURGERY Left    • KNEE ARTHROSCOPY WITH OPEN LIGAMENT REPAIR Left    • POSTPARTUM TUBAL LIGATION Bilateral         FAMILY HISTORY    Family History    Problem Relation Age of Onset   • No Known Problems Mother    • No Known Problems Father    • Breast cancer Sister        SOCIAL HISTORY    Social History     Tobacco Use   • Smoking status: Every Day     Packs/day: 1.50     Years: 45.00     Pack years: 67.50     Types: Cigarettes     Start date: 1/1/1977   • Smokeless tobacco: Never   • Tobacco comments:     Still smoking   Substance Use Topics   • Alcohol use: Yes     Comment: occasional        ALLERGIES    Allergies   Allergen Reactions   • Medrol [Methylprednisolone] Swelling     Dose Julio only         MEDICATIONS    Current Outpatient Medications on File Prior to Visit   Medication Sig Dispense Refill   • albuterol sulfate  (90 Base) MCG/ACT inhaler PROAIR  (90 Base) MCG/ACT AERS     • ALPRAZolam (XANAX) 0.5 MG tablet ALPRAZOLAM 0.5 MG TABS     • aspirin 81 MG chewable tablet Chew 81 mg Daily.     • bumetanide (BUMEX) 2 MG tablet BUMETANIDE 2 MG TABS     • Cholecalciferol (Vitamin D3) 50 MCG (2000 UT) tablet Take 1 tablet by mouth Daily.     • digoxin (LANOXIN) 125 MCG tablet Take 125 mcg by mouth Daily.     • dilTIAZem CD (CARDIZEM CD) 120 MG 24 hr capsule Take 120 mg by mouth Daily.     • DULoxetine (CYMBALTA) 60 MG capsule Take 60 mg by mouth Daily.     • empagliflozin (JARDIANCE) 25 MG tablet tablet JARDIANCE 25 MG TABS     • fentaNYL (DURAGESIC) 12 MCG/HR Place 1 patch on the skin as directed by provider Every 72 (Seventy-Two) Hours.     • Fluticasone-Umeclidin-Vilant (Trelegy Ellipta) 200-62.5-25 MCG/INH inhaler Inhale 1 puff Daily.     • loratadine (CLARITIN) 10 MG tablet Daily.     • metFORMIN (GLUCOPHAGE) 500 MG tablet Take 500 mg by mouth 2 (Two) Times a Day With Meals.     • metoprolol succinate XL (TOPROL-XL) 50 MG 24 hr tablet Take 1 tablet by mouth 2 (Two) Times a Day. 180 tablet 1   • oxyCODONE-acetaminophen (PERCOCET)  MG per tablet Take 1 tablet by mouth Every 6 (Six) Hours As Needed for Moderate Pain .     • potassium  "chloride (K-DUR,KLOR-CON) 20 MEQ CR tablet Take 20 mEq by mouth Daily.     • promethazine (PHENERGAN) 25 MG tablet Take 25 mg by mouth 6 (Six) Times a Day.     • rivaroxaban (XARELTO) 20 MG tablet XARELTO 20 MG TABS       No current facility-administered medications on file prior to visit.       PHYSICAL EXAM    Vitals:    11/21/22 1111   BP: 123/87   BP Location: Left arm   Patient Position: Sitting   Cuff Size: Adult   Pulse: 99   Resp: 17   Temp: 98.2 °F (36.8 °C)   TempSrc: Oral   SpO2: 94%   Weight: 109 kg (239 lb 3.2 oz)   Height: 170.2 cm (67\")        Constitutional:  Well developed, well nourished, no acute distress, non-toxic appearance   Eyes:  PERRL, conjunctiva normal   HENT:  Atraumatic, external ears normal, nose normal, oropharynx moist, no pharyngeal exudates. mallampatti   Neck- normal range of motion, no tenderness, supple   Respiratory:  No respiratory distress, normal breath sounds, no rales, no wheezing   Cardiovascular:  Normal rate, normal rhythm, no murmurs, no gallops, no rubs   GI:  Soft, nondistended, normal bowel sounds, nontender, no organomegaly, no mass, no rebound, no guarding   :  No costovertebral angle tenderness   Musculoskeletal:  No edema, no tenderness, no deformities. Back- no tenderness  Integument:  Well hydrated, no rash   Lymphatic:  No lymphadenopathy noted   Neurologic:  Alert & oriented x 3, CN 2-12 normal, normal motor function, normal sensory function, no focal deficits noted   Psychiatric:  Speech and behavior appropriate     CT Chest Low Dose Cancer Screening WO    Result Date: 10/12/2022   1.  No suspicious pulmonary nodule identified.  No acute process seen within the chest. 2.  Large amount coronary artery calcification.  Lung RADS category:1-negative Lung RADS category S:None  Recommendation:Repeat low-dose screening chest CT in 12 months  Electronically Signed By-Eddie Rosas MD On:10/12/2022 3:15 PM This report was finalized on [id number] by  Eddie " MD Ralph.     Results for orders placed during the hospital encounter of 06/27/22    Adult Transthoracic Echo Complete W/ Cont if Necessary Per Protocol    Interpretation Summary  · Left ventricular systolic function is mildly decreased.  · Left ventricular ejection fraction is 45 to 50%  · Left ventricular wall thickness is consistent with borderline concentric hypertrophy.  · Left ventricular diastolic function was normal.  · The right ventricular cavity is moderately dilated.  · The right atrial cavity is moderately dilated.      ASSESSMENT & PLAN:      62-year-old female with 40 pack year history of smoking progressive shortness of breath    On exam diminished breath sounds with trace edema in the legs with some skin markings and erythema    Although home sleep study did not show obstructive sleep apnea but patient had significant hypoxia during sleep which is related to cardiac etiology patient has been on home oxygen since 2014 after diagnosis of cardiomyopathy    CT scan of the chest no abnormal nodules or interstitial lung disease, heavy calcification noted in the coronary arteries    Very mild COPD   pulmonary function test 10/12/2022.  Spirometry  FVC 3.2 L which is 91%  FEV1 2.5 L which is 90%  FEV1/FVC ratio 76.  Lung volumes  Extra reserve volume 28%  Residual volume 164%  Total lung capacity 126%.  Diffusion capacity 72%      .     Plan    Continue nocturnal oxygen at 3 L    smoking cessation    Continue trelegy, as needed albuterol    Need work-up for coronary artery disease with cardiology given the abnormal calcification noted on the CAT scan and family history of early coronary events        This document has been electronically signed by  Maciel Cardenas MD  11:23 EST

## 2023-01-10 DIAGNOSIS — I42.0 CARDIOMYOPATHY, DILATED: ICD-10-CM

## 2023-01-10 DIAGNOSIS — I48.0 PAROXYSMAL ATRIAL FIBRILLATION: ICD-10-CM

## 2023-01-10 DIAGNOSIS — J44.1 COPD WITH ACUTE EXACERBATION: ICD-10-CM

## 2023-01-10 DIAGNOSIS — I50.9 CONGESTIVE HEART FAILURE, UNSPECIFIED HF CHRONICITY, UNSPECIFIED HEART FAILURE TYPE: ICD-10-CM

## 2023-01-10 DIAGNOSIS — I10 BENIGN ESSENTIAL HTN: ICD-10-CM

## 2023-01-10 DIAGNOSIS — G47.30 SLEEP APNEA, UNSPECIFIED TYPE: ICD-10-CM

## 2023-01-10 RX ORDER — METOPROLOL SUCCINATE 50 MG/1
50 TABLET, EXTENDED RELEASE ORAL 2 TIMES DAILY
Qty: 180 TABLET | Refills: 1 | Status: SHIPPED | OUTPATIENT
Start: 2023-01-10

## 2023-01-10 NOTE — TELEPHONE ENCOUNTER
Caller: MARCEL    Relationship: PHARMACY    Best call back number: [phone]    Requested Prescriptions:   Requested Prescriptions     Pending Prescriptions Disp Refills   • metoprolol succinate XL (TOPROL-XL) 50 MG 24 hr tablet 180 tablet 1     Sig: Take 1 tablet by mouth 2 (Two) Times a Day.        Pharmacy where request should be sent: [facility] - [place], [place] - [address] [place] [phone] [place] [phone]      Additional details provided by patient: ALL OUT    Does the patient have less than a 3 day supply:  [x] Yes  [] No    Would you like a call back once the refill request has been completed: [] Yes [x] No    If the office needs to give you a call back, can they leave a voicemail: [] Yes [x] No    [employer] Rep   01/10/23 10:28 EST

## 2023-03-06 ENCOUNTER — OFFICE VISIT (OUTPATIENT)
Dept: CARDIOLOGY | Facility: CLINIC | Age: 63
End: 2023-03-06
Payer: MEDICARE

## 2023-03-06 VITALS
OXYGEN SATURATION: 94 % | HEART RATE: 116 BPM | DIASTOLIC BLOOD PRESSURE: 80 MMHG | HEIGHT: 67 IN | SYSTOLIC BLOOD PRESSURE: 125 MMHG | WEIGHT: 239 LBS | BODY MASS INDEX: 37.51 KG/M2

## 2023-03-06 DIAGNOSIS — R07.2 PRECORDIAL CHEST PAIN: ICD-10-CM

## 2023-03-06 DIAGNOSIS — I48.0 PAROXYSMAL ATRIAL FIBRILLATION: Primary | ICD-10-CM

## 2023-03-06 DIAGNOSIS — I10 BENIGN ESSENTIAL HTN: ICD-10-CM

## 2023-03-06 DIAGNOSIS — I42.0 CARDIOMYOPATHY, DILATED: ICD-10-CM

## 2023-03-06 DIAGNOSIS — R06.02 SHORTNESS OF BREATH: ICD-10-CM

## 2023-03-06 DIAGNOSIS — E11.9 TYPE 2 DIABETES MELLITUS WITHOUT COMPLICATION, UNSPECIFIED WHETHER LONG TERM INSULIN USE: ICD-10-CM

## 2023-03-06 DIAGNOSIS — I50.9 CONGESTIVE HEART FAILURE, UNSPECIFIED HF CHRONICITY, UNSPECIFIED HEART FAILURE TYPE: ICD-10-CM

## 2023-03-06 PROCEDURE — 99214 OFFICE O/P EST MOD 30 MIN: CPT | Performed by: INTERNAL MEDICINE

## 2023-03-06 PROCEDURE — 93000 ELECTROCARDIOGRAM COMPLETE: CPT | Performed by: INTERNAL MEDICINE

## 2023-03-06 RX ORDER — DILTIAZEM HYDROCHLORIDE 180 MG/1
180 CAPSULE, COATED, EXTENDED RELEASE ORAL DAILY
Qty: 180 CAPSULE | Refills: 1 | Status: SHIPPED | OUTPATIENT
Start: 2023-03-06

## 2023-03-06 RX ORDER — SEMAGLUTIDE 1.34 MG/ML
INJECTION, SOLUTION SUBCUTANEOUS WEEKLY
COMMUNITY

## 2023-03-06 NOTE — PROGRESS NOTES
Date of Office Visit: 2023  Encounter Provider: Dr. Eugene Ordonez  Place of Service: [facility] CARDIOLOGY [place]  Patient Name: Ana Griggs  :1960  Clay Watson MD    Chief Complaint   Patient presents with   • Atrial Fibrillation   • Congestive Heart Failure   • Hypertension   • Cardiomyopathy   • Diabetes   • Follow-up     History of Present Illness    I am pleased to see Mrs. Griggs in my office today as a follow-up    As you know, patient is 62-year-old white female whose past medical history is significant for hypertension, COPD, chronic atrial fibrillation, cardiomyopathy, who came today for follow-up.    In , patient was evaluated for her symptom of shortness of breath and palpitation.  Patient was noted to be in atrial fibrillation and her echocardiogram was noted to have cardiomyopathy.  Patient was started on rate control and anticoagulation.  It is unclear to me whether patient was ever attempted to have cardioversion by previous cardiologist.  Patient reports that she never had cardioversion.    In , patient underwent echocardiogram which showed moderate left ventricular dysfunction with EF of 40%.  No significant valvular heart disease noted.  Stress test at that time showed no myocardial ischemia or infarction.    In 2022, echocardiogram showed mild left ventricular dysfunction with a EF of 45 to 50%.  No significant valvular heart disease noted.    On previous visit I increased her Toprol-XL.  Patient was also advised to continue Cardizem CD.  Her heart rate is still poorly controlled.  Patient does complain of chest pain.  Patient is short of breath.  Patient has seen pulmonologist Dr. Cardenas.  Her PFT showed mild COPD.    On previous visit, I recommended to start Toprol-XL 50 mg daily.  Carvedilol was stopped.  Her heart rate is better controlled but still on the higher side.  She is feeling better.  Patient denies any chest pain or tightness or heaviness.   No leg edema noted.    EKG showed atrial fibrillation with heart rate of 117 bpm    I would increase Cardizem CD to 180 mg daily.  Patient recently had CAT scan of the chest and it showed heavy calcification of coronary arteries.  I would recommend that patient should proceed with cardiac catheterization.  Risk and benefit and intra-abdominal lymph options are explained to the patient        Past Medical History:   Diagnosis Date   • Anxiety    • Anxiety and depression    • Atrial fibrillation (HCC)     chronic   • Bronchitis, chronic (HCC)    • Cardiomegaly    • Cardiomyopathy (HCC)    • CHF (congestive heart failure) (HCC)    • COPD (chronic obstructive pulmonary disease) (HCC)    • Diabetes (HCC)    • GERD (gastroesophageal reflux disease)    • Hepatitis C    • HTN (hypertension)    • Neuropathy    • Numbness and tingling of both feet    • Pain    • Rheumatoid arthritis (HCC)          Past Surgical History:   Procedure Laterality Date   • BUNIONECTOMY Bilateral    • COLONOSCOPY N/A 8/15/2019    Procedure: COLONOSCOPY WITH POLYPECTOMY X3;  Surgeon: Lilo Ferguson MD;  Location: [facility] ENDOSCOPY;  Service: Gastroenterology   • DILATATION AND CURETTAGE Bilateral    • FEMUR FRACTURE SURGERY Left    • KNEE ARTHROSCOPY WITH OPEN LIGAMENT REPAIR Left    • POSTPARTUM TUBAL LIGATION Bilateral 1992           Current Outpatient Medications:   •  albuterol sulfate  (90 Base) MCG/ACT inhaler, PROAIR  (90 Base) MCG/ACT AERS, Disp: , Rfl:   •  ALPRAZolam (XANAX) 0.5 MG tablet, ALPRAZOLAM 0.5 MG TABS, Disp: , Rfl:   •  aspirin 81 MG chewable tablet, Chew 1 tablet Daily., Disp: , Rfl:   •  bumetanide (BUMEX) 2 MG tablet, BUMETANIDE 2 MG TABS, Disp: , Rfl:   •  Cholecalciferol (Vitamin D3) 50 MCG (2000 UT) tablet, Take 1 tablet by mouth Daily., Disp: , Rfl:   •  digoxin (LANOXIN) 125 MCG tablet, Take 1 tablet by mouth Daily., Disp: , Rfl:   •  DULoxetine (CYMBALTA) 60 MG capsule, Take 1 capsule by mouth Daily., Disp:  , Rfl:   •  empagliflozin (JARDIANCE) 25 MG tablet tablet, JARDIANCE 25 MG TABS, Disp: , Rfl:   •  fentaNYL (DURAGESIC) 12 MCG/HR, Place 1 patch on the skin as directed by provider Every 72 (Seventy-Two) Hours., Disp: , Rfl:   •  Fluticasone-Umeclidin-Vilant (Trelegy Ellipta) 200-62.5-25 MCG/INH inhaler, Inhale 1 puff Daily., Disp: , Rfl:   •  loratadine (CLARITIN) 10 MG tablet, Daily., Disp: , Rfl:   •  metFORMIN (GLUCOPHAGE) 500 MG tablet, Take 1 tablet by mouth 2 (Two) Times a Day With Meals., Disp: , Rfl:   •  metoprolol succinate XL (TOPROL-XL) 50 MG 24 hr tablet, Take 1 tablet by mouth 2 (Two) Times a Day., Disp: 180 tablet, Rfl: 1  •  oxyCODONE-acetaminophen (PERCOCET)  MG per tablet, Take 1 tablet by mouth Every 6 (Six) Hours As Needed for Moderate Pain., Disp: , Rfl:   •  potassium chloride (K-DUR,KLOR-CON) 20 MEQ CR tablet, Take 1 tablet by mouth Daily., Disp: , Rfl:   •  promethazine (PHENERGAN) 25 MG tablet, Take 1 tablet by mouth 6 (Six) Times a Day., Disp: , Rfl:   •  rivaroxaban (XARELTO) 20 MG tablet, XARELTO 20 MG TABS, Disp: , Rfl:   •  Semaglutide, 1 MG/DOSE, (Ozempic, 1 MG/DOSE,) 2 MG/1.5ML solution pen-injector, Inject  under the skin into the appropriate area as directed 1 (One) Time Per Week., Disp: , Rfl:   •  dilTIAZem CD (Cardizem CD) 180 MG 24 hr capsule, Take 1 capsule by mouth Daily., Disp: 180 capsule, Rfl: 1      Social History     Socioeconomic History   • Marital status:    Tobacco Use   • Smoking status: Every Day     Packs/day: 1.50     Years: 45.00     Pack years: 67.50     Types: Cigarettes     Start date: 1/1/1977   • Smokeless tobacco: Never   • Tobacco comments:     Still smoking   Vaping Use   • Vaping Use: Never used   Substance and Sexual Activity   • Alcohol use: Yes     Comment: occasional   • Drug use: No   • Sexual activity: Not Currently         Review of Systems   Constitutional: Negative for chills and fever.   HENT: Negative for ear discharge and  "nosebleeds.    Eyes: Negative for discharge and redness.   Cardiovascular: Negative for chest pain, orthopnea, palpitations, paroxysmal nocturnal dyspnea and syncope.   Respiratory: Positive for shortness of breath. Negative for cough and wheezing.    Endocrine: Negative for heat intolerance.   Skin: Negative for rash.   Musculoskeletal: Negative for arthritis and myalgias.   Gastrointestinal: Negative for abdominal pain, melena, nausea and vomiting.   Genitourinary: Negative for dysuria and hematuria.   Neurological: Negative for dizziness, light-headedness, numbness and tremors.   Psychiatric/Behavioral: Negative for depression. The patient is not nervous/anxious.        Procedures      ECG 12 Lead    Date/Time: 3/6/2023 11:21 AM  Performed by: Eugene Ordonez MD  Authorized by: Eugene Ordonez MD   Comparison: compared with previous ECG   Similar to previous ECG  Rhythm: atrial fibrillation    Clinical impression: abnormal EKG            ECG 12 Lead    (Results Pending)   ECG 12 Lead    (Results Pending)           Objective:    /80 (BP Location: Right arm, Patient Position: Sitting, Cuff Size: Large Adult)   Pulse 116   Ht 170.2 cm (67.01\")   Wt 108 kg (239 lb)   SpO2 94%   BMI 37.42 kg/m²         Constitutional:       Appearance: Well-developed.   Eyes:      General: No scleral icterus.        Right eye: No discharge.   HENT:      Head: Normocephalic and atraumatic.   Neck:      Thyroid: No thyromegaly.      Lymphadenopathy: No cervical adenopathy.   Pulmonary:      Effort: Pulmonary effort is normal. No respiratory distress.      Breath sounds: Normal breath sounds. No wheezing. No rales.   Cardiovascular:      Normal rate. Irregularly irregular rhythm.      No gallop.   Abdominal:      Tenderness: There is no abdominal tenderness.   Skin:     Findings: No erythema or rash.   Neurological:      Mental Status: Alert and oriented to person, place, and time.             Assessment:       Diagnosis Plan   1. " Paroxysmal atrial fibrillation (HCC)  ECG 12 Lead    dilTIAZem CD (Cardizem CD) 180 MG 24 hr capsule      2. Cardiomyopathy, dilated (HCC)  ECG 12 Lead    dilTIAZem CD (Cardizem CD) 180 MG 24 hr capsule    Case Request Cath Lab: Left Heart Cath    CBC (No Diff)    Basic Metabolic Panel    Protime-INR    aPTT    ECG 12 Lead      3. Congestive heart failure, unspecified HF chronicity, unspecified heart failure type (HCC)  ECG 12 Lead    dilTIAZem CD (Cardizem CD) 180 MG 24 hr capsule    Case Request Cath Lab: Left Heart Cath    CBC (No Diff)    Basic Metabolic Panel    Protime-INR    aPTT    ECG 12 Lead      4. Type 2 diabetes mellitus without complication, unspecified whether long term insulin use (HCC)  ECG 12 Lead    dilTIAZem CD (Cardizem CD) 180 MG 24 hr capsule      5. Benign essential HTN  ECG 12 Lead    dilTIAZem CD (Cardizem CD) 180 MG 24 hr capsule      6. Shortness of breath  Case Request Cath Lab: Left Heart Cath    CBC (No Diff)    Basic Metabolic Panel    Protime-INR    aPTT    ECG 12 Lead      7. Precordial chest pain  Case Request Cath Lab: Left Heart Cath    CBC (No Diff)    Basic Metabolic Panel    Protime-INR    aPTT    ECG 12 Lead               Plan:       MDM:    1.  Chest chest pain:    Patient is having ongoing chest pain.  Previous stress test is negative.  Patient had cardiomyopathy and recent CAT scan of chest showed heavy calcification of coronary arteries.  I would recommend to proceed with cardiac catheterization to assess the obstructive CAD    2.  Shortness of breath:    Patient has shortness of breath.  I would recommend to proceed with cardiac catheterization.  PFT shows only mild asthma.    3.  Hypertension:    Blood pressure is controlled current treatment would be continued    4.  Atrial fibrillation chronic:    Patient rate is poorly controlled I would start Cardizem  mg daily.  Continue Toprol-XL 50 mg twice daily    5.  Diabetes mellitus:    Patient is on metformin.

## 2023-03-25 ENCOUNTER — LAB (OUTPATIENT)
Dept: LAB | Facility: HOSPITAL | Age: 63
End: 2023-03-25
Payer: MEDICARE

## 2023-03-25 ENCOUNTER — APPOINTMENT (OUTPATIENT)
Dept: CARDIOLOGY | Facility: HOSPITAL | Age: 63
End: 2023-03-25
Payer: MEDICARE

## 2023-03-25 DIAGNOSIS — I42.0 CARDIOMYOPATHY, DILATED: ICD-10-CM

## 2023-03-25 DIAGNOSIS — R07.2 PRECORDIAL CHEST PAIN: ICD-10-CM

## 2023-03-25 DIAGNOSIS — R06.02 SHORTNESS OF BREATH: ICD-10-CM

## 2023-03-25 DIAGNOSIS — I50.9 CONGESTIVE HEART FAILURE, UNSPECIFIED HF CHRONICITY, UNSPECIFIED HEART FAILURE TYPE: ICD-10-CM

## 2023-03-25 LAB
ANION GAP SERPL CALCULATED.3IONS-SCNC: 10.5 MMOL/L (ref 5–15)
APTT PPP: 39.4 SECONDS (ref 24–31)
BUN SERPL-MCNC: 13 MG/DL (ref 8–23)
BUN/CREAT SERPL: 14.6 (ref 7–25)
CALCIUM SPEC-SCNC: 10.4 MG/DL (ref 8.6–10.5)
CHLORIDE SERPL-SCNC: 96 MMOL/L (ref 98–107)
CO2 SERPL-SCNC: 30.5 MMOL/L (ref 22–29)
CREAT SERPL-MCNC: 0.89 MG/DL (ref 0.57–1)
DEPRECATED RDW RBC AUTO: 45.3 FL (ref 37–54)
EGFRCR SERPLBLD CKD-EPI 2021: 73.9 ML/MIN/1.73
ERYTHROCYTE [DISTWIDTH] IN BLOOD BY AUTOMATED COUNT: 13.6 % (ref 12.3–15.4)
GLUCOSE SERPL-MCNC: 111 MG/DL (ref 65–99)
HCT VFR BLD AUTO: 59 % (ref 34–46.6)
HGB BLD-MCNC: 20.5 G/DL (ref 12–15.9)
INR PPP: 1.26 (ref 0.93–1.1)
MCH RBC QN AUTO: 31.9 PG (ref 26.6–33)
MCHC RBC AUTO-ENTMCNC: 34.7 G/DL (ref 31.5–35.7)
MCV RBC AUTO: 91.9 FL (ref 79–97)
PLATELET # BLD AUTO: 236 10*3/MM3 (ref 140–450)
PMV BLD AUTO: 9.6 FL (ref 6–12)
POTASSIUM SERPL-SCNC: 4.5 MMOL/L (ref 3.5–5.2)
PROTHROMBIN TIME: 12.8 SECONDS (ref 9.6–11.7)
RBC # BLD AUTO: 6.42 10*6/MM3 (ref 3.77–5.28)
SODIUM SERPL-SCNC: 137 MMOL/L (ref 136–145)
WBC NRBC COR # BLD: 9.24 10*3/MM3 (ref 3.4–10.8)

## 2023-03-25 PROCEDURE — 85730 THROMBOPLASTIN TIME PARTIAL: CPT

## 2023-03-25 PROCEDURE — 36415 COLL VENOUS BLD VENIPUNCTURE: CPT

## 2023-03-25 PROCEDURE — 85027 COMPLETE CBC AUTOMATED: CPT

## 2023-03-25 PROCEDURE — 85610 PROTHROMBIN TIME: CPT

## 2023-03-25 PROCEDURE — 80048 BASIC METABOLIC PNL TOTAL CA: CPT

## 2023-03-27 DIAGNOSIS — D58.2 ELEVATED HEMOGLOBIN: Primary | ICD-10-CM

## 2023-03-29 ENCOUNTER — TELEPHONE (OUTPATIENT)
Dept: ONCOLOGY | Facility: CLINIC | Age: 63
End: 2023-03-29
Payer: MEDICARE

## 2023-04-03 ENCOUNTER — TELEPHONE (OUTPATIENT)
Dept: ONCOLOGY | Facility: CLINIC | Age: 63
End: 2023-04-03
Payer: MEDICARE

## 2023-04-07 NOTE — PROGRESS NOTES
HEMATOLOGY ONCOLOGY OUTPATIENT CONSULTATION       Patient name: Ana Griggs  : 1960  MRN: [id number]  Primary Care Physician: Clay Watson MD  Referring Physician: Clay Watson,*  Reason For Consult: erythrocytosis      History of Present Illness:  Patient is a 62 y.o. female with longstanding history of smoking who was referred for work-up of elevated hemoglobin.  Patient was previously seen in 2019 for erythrocytosis at that point she had a JAK2 V6 1 7 mutation analysis which was negative.  She also had a BCR-ABL mutational analysis which was negative.  Patient had phlebotomy at that point.  She was then lost to follow-up.    3/25/2023 -WBC 9.24, hemoglobin 20.5, platelet 236        Subjective:  Patient presents for initial consultation.  She has some ongoing fatigue no other constitutional symptoms.  No venous thromboembolism no signs or symptoms of VTE, no history of stroke.      Past Medical History:   Diagnosis Date   • Anxiety    • Anxiety and depression    • Atrial fibrillation     chronic   • Bronchitis, chronic    • Cardiomegaly    • Cardiomyopathy    • CHF (congestive heart failure)    • COPD (chronic obstructive pulmonary disease)    • Diabetes    • GERD (gastroesophageal reflux disease)    • Hepatitis C    • HTN (hypertension)    • Neuropathy    • Numbness and tingling of both feet    • Pain    • Rheumatoid arthritis        Past Surgical History:   Procedure Laterality Date   • BUNIONECTOMY Bilateral    • COLONOSCOPY N/A 8/15/2019    Procedure: COLONOSCOPY WITH POLYPECTOMY X3;  Surgeon: Lilo Ferguson MD;  Location: [facility] ENDOSCOPY;  Service: Gastroenterology   • DILATATION AND CURETTAGE Bilateral    • FEMUR FRACTURE SURGERY Left    • KNEE ARTHROSCOPY WITH OPEN LIGAMENT REPAIR Left    • POSTPARTUM TUBAL LIGATION Bilateral          Current Outpatient Medications:   •  albuterol sulfate  (90 Base) MCG/ACT inhaler, PROAIR  (90  Base) MCG/ACT AERS, Disp: , Rfl:   •  ALPRAZolam (XANAX) 0.5 MG tablet, ALPRAZOLAM 0.5 MG TABS, Disp: , Rfl:   •  aspirin 81 MG chewable tablet, Chew 1 tablet Daily., Disp: , Rfl:   •  bumetanide (BUMEX) 2 MG tablet, BUMETANIDE 2 MG TABS, Disp: , Rfl:   •  Cholecalciferol (Vitamin D3) 50 MCG (2000 UT) tablet, Take 1 tablet by mouth Daily., Disp: , Rfl:   •  digoxin (LANOXIN) 125 MCG tablet, Take 1 tablet by mouth Daily., Disp: , Rfl:   •  dilTIAZem CD (Cardizem CD) 180 MG 24 hr capsule, Take 1 capsule by mouth Daily., Disp: 180 capsule, Rfl: 1  •  DULoxetine (CYMBALTA) 60 MG capsule, Take 1 capsule by mouth Daily., Disp: , Rfl:   •  empagliflozin (JARDIANCE) 25 MG tablet tablet, JARDIANCE 25 MG TABS, Disp: , Rfl:   •  fentaNYL (DURAGESIC) 12 MCG/HR, Place 1 patch on the skin as directed by provider Every 72 (Seventy-Two) Hours., Disp: , Rfl:   •  Fluticasone-Umeclidin-Vilant (Trelegy Ellipta) 200-62.5-25 MCG/INH inhaler, Inhale 1 puff Daily., Disp: , Rfl:   •  loratadine (CLARITIN) 10 MG tablet, Daily., Disp: , Rfl:   •  metFORMIN (GLUCOPHAGE) 500 MG tablet, Take 1 tablet by mouth 2 (Two) Times a Day With Meals., Disp: , Rfl:   •  metoprolol succinate XL (TOPROL-XL) 50 MG 24 hr tablet, Take 1 tablet by mouth 2 (Two) Times a Day., Disp: 180 tablet, Rfl: 1  •  oxyCODONE-acetaminophen (PERCOCET)  MG per tablet, Take 1 tablet by mouth Every 6 (Six) Hours As Needed for Moderate Pain., Disp: , Rfl:   •  potassium chloride (K-DUR,KLOR-CON) 20 MEQ CR tablet, Take 1 tablet by mouth Daily., Disp: , Rfl:   •  promethazine (PHENERGAN) 25 MG tablet, Take 1 tablet by mouth 6 (Six) Times a Day., Disp: , Rfl:   •  rivaroxaban (XARELTO) 20 MG tablet, XARELTO 20 MG TABS, Disp: , Rfl:   •  Semaglutide, 1 MG/DOSE, (Ozempic, 1 MG/DOSE,) 2 MG/1.5ML solution pen-injector, Inject  under the skin into the appropriate area as directed 1 (One) Time Per Week., Disp: , Rfl:   •  multivitamin with minerals (CENTRUM SILVER 50+WOMEN PO), ,  "Disp: , Rfl:     Allergies   Allergen Reactions   • Medrol [Methylprednisolone] Swelling     Dose Julio only       Family History   Problem Relation Age of Onset   • No Known Problems Mother    • No Known Problems Father    • Breast cancer Sister        Cancer-related family history includes Breast cancer in her sister.      Social History     Tobacco Use   • Smoking status: Every Day     Packs/day: 1.50     Years: 45.00     Pack years: 67.50     Types: Cigarettes     Start date: 1/1/1977   • Smokeless tobacco: Never   • Tobacco comments:     Patient is advised to quit smoking   Vaping Use   • Vaping Use: Never used   Substance Use Topics   • Alcohol use: Yes     Comment: occasional   • Drug use: No     Social History     Social History Narrative   • Not on file       ROS:   Review of Systems   Constitutional: Positive for fatigue. Negative for fever.   HENT: Negative for congestion and nosebleeds.    Eyes: Negative for pain.   Respiratory: Negative for cough and shortness of breath.    Cardiovascular: Negative for chest pain.   Gastrointestinal: Negative for abdominal pain, blood in stool, diarrhea, nausea and vomiting.   Endocrine: Negative for cold intolerance and heat intolerance.   Genitourinary: Negative for difficulty urinating.   Musculoskeletal: Negative for arthralgias.   Skin: Negative for rash.   Neurological: Negative for dizziness and headaches.   Hematological: Does not bruise/bleed easily.   Psychiatric/Behavioral: Negative for behavioral problems.     Objective:    Vital Signs:  Vitals:    04/11/23 1143   BP: 116/72   Pulse: 101   Resp: 18   Temp: 98.2 °F (36.8 °C)   SpO2: 92%   Weight: 101 kg (221 lb 11.2 oz)   Height: 170.2 cm (67\")   PainSc:   6   PainLoc: Generalized     Body mass index is 34.72 kg/m².    ECOG  (0) Fully active, able to carry on all predisease performance without restriction    Physical Exam:   Physical Exam  Constitutional:       Appearance: Normal appearance.   HENT:      Head: " Normocephalic and atraumatic.   Eyes:      Pupils: Pupils are equal, round, and reactive to light.   Cardiovascular:      Rate and Rhythm: Normal rate and regular rhythm.      Pulses: Normal pulses.      Heart sounds: No murmur heard.  Pulmonary:      Effort: Pulmonary effort is normal.      Breath sounds: Normal breath sounds.   Abdominal:      General: There is no distension.      Palpations: Abdomen is soft. There is no mass.      Tenderness: There is no abdominal tenderness.   Musculoskeletal:         General: Normal range of motion.      Cervical back: Normal range of motion and neck supple.   Skin:     General: Skin is warm.   Neurological:      General: No focal deficit present.      Mental Status: She is alert.   Psychiatric:         Mood and Affect: Mood normal.         Lab Results - Last 18 Months   Lab Units 04/11/23  1136 03/25/23  1140   WBC 10*3/mm3 9.10 9.24   HEMOGLOBIN g/dL 20.5* 20.5*   HEMATOCRIT % 59.2* 59.0*   PLATELETS 10*3/mm3 204 236   MCV fL 94.1 91.9     Lab Results - Last 18 Months   Lab Units 03/25/23  1140   SODIUM mmol/L 137   POTASSIUM mmol/L 4.5   CHLORIDE mmol/L 96*   CO2 mmol/L 30.5*   BUN mg/dL 13   CREATININE mg/dL 0.89   CALCIUM mg/dL 10.4   GLUCOSE mg/dL 111*       Lab Results   Component Value Date    GLUCOSE 111 (H) 03/25/2023    BUN 13 03/25/2023    CREATININE 0.89 03/25/2023    EGFRIFNONA 103 07/01/2019    BCR 14.6 03/25/2023    K 4.5 03/25/2023    CO2 30.5 (H) 03/25/2023    CALCIUM 10.4 03/25/2023    ALBUMIN 3.60 07/01/2019    LABIL2 1.0 03/12/2019    AST 23 07/01/2019    ALT 26 07/01/2019       Lab Results - Last 18 Months   Lab Units 03/25/23  1140   INR  1.26*   APTT seconds 39.4*       No results found for: IRON, TIBC, FERRITIN    No results found for: FOLATE    No results found for: OCCULTBLD    No results found for: RETICCTPCT  No results found for: [id number]  No results found for: SPEP, UPEP  No results found for: LDH, URICACID  No results found for: JOSELIN, RF,  SEDRATE  No results found for: FIBRINOGEN, HAPTOGLOBIN  Lab Results   Component Value Date    PTT 39.4 (H) 03/25/2023    INR 1.26 (H) 03/25/2023     No results found for:   No results found for: CEA  No components found for: CA-19-9  No results found for: PSA  No results found for: SEDRATE       Assessment & Plan     Patient is a 62-year-old female with history of secondary polycythemia    Secondary polycythemia  This is presumed secondary polycythemia however erythropoietin, JAK2 exon 12 mutation has not been checked.  This will be checked to make sure this is not a primary polycythemia vera  Regardless with her symptoms she will benefit from a hematocrit that is around 50 or less.  We will plan on phlebotomy, I will send the above testing and follow-up after work-up is done.    If she does have a primary polycythemia vera, will add hydroxyurea.      Thank you very much for providing the opportunity to participate in this patient’s care. Please do not hesitate to call if there are any other questions.

## 2023-04-11 ENCOUNTER — APPOINTMENT (OUTPATIENT)
Dept: LAB | Facility: HOSPITAL | Age: 63
End: 2023-04-11
Payer: MEDICARE

## 2023-04-11 ENCOUNTER — CONSULT (OUTPATIENT)
Dept: ONCOLOGY | Facility: CLINIC | Age: 63
End: 2023-04-11
Payer: MEDICARE

## 2023-04-11 ENCOUNTER — HOSPITAL ENCOUNTER (OUTPATIENT)
Dept: ONCOLOGY | Facility: HOSPITAL | Age: 63
Discharge: HOME | End: 2023-04-11
Payer: MEDICARE

## 2023-04-11 VITALS
DIASTOLIC BLOOD PRESSURE: 72 MMHG | OXYGEN SATURATION: 92 % | RESPIRATION RATE: 18 BRPM | SYSTOLIC BLOOD PRESSURE: 116 MMHG | TEMPERATURE: 98.2 F | WEIGHT: 221.7 LBS | BODY MASS INDEX: 34.8 KG/M2 | HEART RATE: 101 BPM | HEIGHT: 67 IN

## 2023-04-11 VITALS — SYSTOLIC BLOOD PRESSURE: 132 MMHG | DIASTOLIC BLOOD PRESSURE: 70 MMHG | HEART RATE: 85 BPM

## 2023-04-11 DIAGNOSIS — Z79.01 LONG TERM CURRENT USE OF ANTICOAGULANT THERAPY: ICD-10-CM

## 2023-04-11 DIAGNOSIS — D75.1 POLYCYTHEMIA: Primary | ICD-10-CM

## 2023-04-11 LAB
BASOPHILS # BLD AUTO: 0.2 10*3/MM3 (ref 0–0.2)
BASOPHILS NFR BLD AUTO: 2.2 % (ref 0–1.5)
DEPRECATED RDW RBC AUTO: 51.7 FL (ref 37–54)
EOSINOPHIL # BLD AUTO: 0.12 10*3/MM3 (ref 0–0.4)
EOSINOPHIL NFR BLD AUTO: 1.3 % (ref 0.3–6.2)
ERYTHROCYTE [DISTWIDTH] IN BLOOD BY AUTOMATED COUNT: 15.8 % (ref 12.3–15.4)
HCT VFR BLD AUTO: 59.2 % (ref 34–46.6)
HGB BLD-MCNC: 20.5 G/DL (ref 12–15.9)
LYMPHOCYTES # BLD AUTO: 2.79 10*3/MM3 (ref 0.7–3.1)
LYMPHOCYTES NFR BLD AUTO: 30.7 % (ref 19.6–45.3)
MCH RBC QN AUTO: 32.6 PG (ref 26.6–33)
MCHC RBC AUTO-ENTMCNC: 34.6 G/DL (ref 31.5–35.7)
MCV RBC AUTO: 94.1 FL (ref 79–97)
MONOCYTES # BLD AUTO: 1.02 10*3/MM3 (ref 0.1–0.9)
MONOCYTES NFR BLD AUTO: 11.2 % (ref 5–12)
NEUTROPHILS NFR BLD AUTO: 4.97 10*3/MM3 (ref 1.7–7)
NEUTROPHILS NFR BLD AUTO: 54.6 % (ref 42.7–76)
PLATELET # BLD AUTO: 204 10*3/MM3 (ref 140–450)
PMV BLD AUTO: 10.1 FL (ref 6–12)
RBC # BLD AUTO: 6.29 10*6/MM3 (ref 3.77–5.28)
WBC NRBC COR # BLD: 9.1 10*3/MM3 (ref 3.4–10.8)

## 2023-04-11 PROCEDURE — 99195 PHLEBOTOMY: CPT

## 2023-04-11 PROCEDURE — 85025 COMPLETE CBC W/AUTO DIFF WBC: CPT | Performed by: INTERNAL MEDICINE

## 2023-04-11 PROCEDURE — 36415 COLL VENOUS BLD VENIPUNCTURE: CPT | Performed by: INTERNAL MEDICINE

## 2023-04-11 RX ORDER — MULTIPLE VITAMINS W/ MINERALS TAB 9MG-400MCG
TAB ORAL
COMMUNITY

## 2023-04-11 NOTE — PROGRESS NOTES
Pt here from visit with Dr Malhotra for therapeutic phlebotomy. 400 ml removed and pt tolerated well. AVS given and pt verbalized understanding.

## 2023-04-11 NOTE — LETTER
2023     Clay Watson MD  [address] [facility] [place] [address]    Patient: Ana Griggs   YOB: 1960   Date of Visit: 2023       Dear Dr. Shirley MD:    Thank you for referring Ana Griggs to me for evaluation. Below are the relevant portions of my assessment and plan of care.    If you have questions, please do not hesitate to call me. I look forward to following Ana along with you.         Sincerely,        Jean Claude Malhotra MD        CC: Jean Claude Woodall MD  23  Signed                           HEMATOLOGY ONCOLOGY OUTPATIENT CONSULTATION       Patient name: Ana Griggs  : 1960  MRN: [id number]  Primary Care Physician: Clay Watson MD  Referring Physician: Clay Watson,*  Reason For Consult: erythrocytosis      History of Present Illness:  Patient is a 62 y.o. female with longstanding history of smoking who was referred for work-up of elevated hemoglobin.  Patient was previously seen in 2019 for erythrocytosis at that point she had a JAK2 V6 1 7 mutation analysis which was negative.  She also had a BCR-ABL mutational analysis which was negative.  Patient had phlebotomy at that point.  She was then lost to follow-up.    3/25/2023 -WBC 9.24, hemoglobin 20.5, platelet 236        Subjective:  Patient presents for initial consultation.  She has some ongoing fatigue no other constitutional symptoms.  No venous thromboembolism no signs or symptoms of VTE, no history of stroke.      Past Medical History:   Diagnosis Date   • Anxiety    • Anxiety and depression    • Atrial fibrillation     chronic   • Bronchitis, chronic    • Cardiomegaly    • Cardiomyopathy    • CHF (congestive heart failure)    • COPD (chronic obstructive pulmonary disease)    • Diabetes    • GERD (gastroesophageal reflux disease)    • Hepatitis C    • HTN (hypertension)    • Neuropathy    • Numbness and tingling of both feet    • Pain    • Rheumatoid  arthritis        Past Surgical History:   Procedure Laterality Date   • BUNIONECTOMY Bilateral    • COLONOSCOPY N/A 8/15/2019    Procedure: COLONOSCOPY WITH POLYPECTOMY X3;  Surgeon: Lilo Ferguson MD;  Location: [facility] ENDOSCOPY;  Service: Gastroenterology   • DILATATION AND CURETTAGE Bilateral    • FEMUR FRACTURE SURGERY Left    • KNEE ARTHROSCOPY WITH OPEN LIGAMENT REPAIR Left    • POSTPARTUM TUBAL LIGATION Bilateral 1992         Current Outpatient Medications:   •  albuterol sulfate  (90 Base) MCG/ACT inhaler, PROAIR  (90 Base) MCG/ACT AERS, Disp: , Rfl:   •  ALPRAZolam (XANAX) 0.5 MG tablet, ALPRAZOLAM 0.5 MG TABS, Disp: , Rfl:   •  aspirin 81 MG chewable tablet, Chew 1 tablet Daily., Disp: , Rfl:   •  bumetanide (BUMEX) 2 MG tablet, BUMETANIDE 2 MG TABS, Disp: , Rfl:   •  Cholecalciferol (Vitamin D3) 50 MCG (2000 UT) tablet, Take 1 tablet by mouth Daily., Disp: , Rfl:   •  digoxin (LANOXIN) 125 MCG tablet, Take 1 tablet by mouth Daily., Disp: , Rfl:   •  dilTIAZem CD (Cardizem CD) 180 MG 24 hr capsule, Take 1 capsule by mouth Daily., Disp: 180 capsule, Rfl: 1  •  DULoxetine (CYMBALTA) 60 MG capsule, Take 1 capsule by mouth Daily., Disp: , Rfl:   •  empagliflozin (JARDIANCE) 25 MG tablet tablet, JARDIANCE 25 MG TABS, Disp: , Rfl:   •  fentaNYL (DURAGESIC) 12 MCG/HR, Place 1 patch on the skin as directed by provider Every 72 (Seventy-Two) Hours., Disp: , Rfl:   •  Fluticasone-Umeclidin-Vilant (Trelegy Ellipta) 200-62.5-25 MCG/INH inhaler, Inhale 1 puff Daily., Disp: , Rfl:   •  loratadine (CLARITIN) 10 MG tablet, Daily., Disp: , Rfl:   •  metFORMIN (GLUCOPHAGE) 500 MG tablet, Take 1 tablet by mouth 2 (Two) Times a Day With Meals., Disp: , Rfl:   •  metoprolol succinate XL (TOPROL-XL) 50 MG 24 hr tablet, Take 1 tablet by mouth 2 (Two) Times a Day., Disp: 180 tablet, Rfl: 1  •  oxyCODONE-acetaminophen (PERCOCET)  MG per tablet, Take 1 tablet by mouth Every 6 (Six) Hours As Needed for Moderate  Pain., Disp: , Rfl:   •  potassium chloride (K-DUR,KLOR-CON) 20 MEQ CR tablet, Take 1 tablet by mouth Daily., Disp: , Rfl:   •  promethazine (PHENERGAN) 25 MG tablet, Take 1 tablet by mouth 6 (Six) Times a Day., Disp: , Rfl:   •  rivaroxaban (XARELTO) 20 MG tablet, XARELTO 20 MG TABS, Disp: , Rfl:   •  Semaglutide, 1 MG/DOSE, (Ozempic, 1 MG/DOSE,) 2 MG/1.5ML solution pen-injector, Inject  under the skin into the appropriate area as directed 1 (One) Time Per Week., Disp: , Rfl:   •  multivitamin with minerals (CENTRUM SILVER 50+WOMEN PO), , Disp: , Rfl:     Allergies   Allergen Reactions   • Medrol [Methylprednisolone] Swelling     Dose Julio only       Family History   Problem Relation Age of Onset   • No Known Problems Mother    • No Known Problems Father    • Breast cancer Sister        Cancer-related family history includes Breast cancer in her sister.      Social History     Tobacco Use   • Smoking status: Every Day     Packs/day: 1.50     Years: 45.00     Pack years: 67.50     Types: Cigarettes     Start date: 1/1/1977   • Smokeless tobacco: Never   • Tobacco comments:     Patient is advised to quit smoking   Vaping Use   • Vaping Use: Never used   Substance Use Topics   • Alcohol use: Yes     Comment: occasional   • Drug use: No     Social History     Social History Narrative   • Not on file       ROS:   Review of Systems   Constitutional: Positive for fatigue. Negative for fever.   HENT: Negative for congestion and nosebleeds.    Eyes: Negative for pain.   Respiratory: Negative for cough and shortness of breath.    Cardiovascular: Negative for chest pain.   Gastrointestinal: Negative for abdominal pain, blood in stool, diarrhea, nausea and vomiting.   Endocrine: Negative for cold intolerance and heat intolerance.   Genitourinary: Negative for difficulty urinating.   Musculoskeletal: Negative for arthralgias.   Skin: Negative for rash.   Neurological: Negative for dizziness and headaches.   Hematological: Does  "not bruise/bleed easily.   Psychiatric/Behavioral: Negative for behavioral problems.     Objective:    Vital Signs:  Vitals:    04/11/23 1143   BP: 116/72   Pulse: 101   Resp: 18   Temp: 98.2 °F (36.8 °C)   SpO2: 92%   Weight: 101 kg (221 lb 11.2 oz)   Height: 170.2 cm (67\")   PainSc:   6   PainLoc: Generalized     Body mass index is 34.72 kg/m².    ECOG  (0) Fully active, able to carry on all predisease performance without restriction    Physical Exam:   Physical Exam  Constitutional:       Appearance: Normal appearance.   HENT:      Head: Normocephalic and atraumatic.   Eyes:      Pupils: Pupils are equal, round, and reactive to light.   Cardiovascular:      Rate and Rhythm: Normal rate and regular rhythm.      Pulses: Normal pulses.      Heart sounds: No murmur heard.  Pulmonary:      Effort: Pulmonary effort is normal.      Breath sounds: Normal breath sounds.   Abdominal:      General: There is no distension.      Palpations: Abdomen is soft. There is no mass.      Tenderness: There is no abdominal tenderness.   Musculoskeletal:         General: Normal range of motion.      Cervical back: Normal range of motion and neck supple.   Skin:     General: Skin is warm.   Neurological:      General: No focal deficit present.      Mental Status: She is alert.   Psychiatric:         Mood and Affect: Mood normal.         Lab Results - Last 18 Months   Lab Units 04/11/23  1136 03/25/23  1140   WBC 10*3/mm3 9.10 9.24   HEMOGLOBIN g/dL 20.5* 20.5*   HEMATOCRIT % 59.2* 59.0*   PLATELETS 10*3/mm3 204 236   MCV fL 94.1 91.9     Lab Results - Last 18 Months   Lab Units 03/25/23  1140   SODIUM mmol/L 137   POTASSIUM mmol/L 4.5   CHLORIDE mmol/L 96*   CO2 mmol/L 30.5*   BUN mg/dL 13   CREATININE mg/dL 0.89   CALCIUM mg/dL 10.4   GLUCOSE mg/dL 111*       Lab Results   Component Value Date    GLUCOSE 111 (H) 03/25/2023    BUN 13 03/25/2023    CREATININE 0.89 03/25/2023    EGFRIFNONA 103 07/01/2019    BCR 14.6 03/25/2023    K 4.5 " 03/25/2023    CO2 30.5 (H) 03/25/2023    CALCIUM 10.4 03/25/2023    ALBUMIN 3.60 07/01/2019    LABIL2 1.0 03/12/2019    AST 23 07/01/2019    ALT 26 07/01/2019       Lab Results - Last 18 Months   Lab Units 03/25/23  1140   INR  1.26*   APTT seconds 39.4*       No results found for: IRON, TIBC, FERRITIN    No results found for: FOLATE    No results found for: OCCULTBLD    No results found for: RETICCTPCT  No results found for: LDXRSWGR42  No results found for: SPEP, UPEP  No results found for: LDH, URICACID  No results found for: JOSELIN, RF, SEDRATE  No results found for: FIBRINOGEN, HAPTOGLOBIN  Lab Results   Component Value Date    PTT 39.4 (H) 03/25/2023    INR 1.26 (H) 03/25/2023     No results found for:   No results found for: CEA  No components found for: CA-19-9  No results found for: PSA  No results found for: SEDRATE       Assessment & Plan     Patient is a 62-year-old female with history of secondary polycythemia    Secondary polycythemia  This is presumed secondary polycythemia however erythropoietin, JAK2 exon 12 mutation has not been checked.  This will be checked to make sure this is not a primary polycythemia vera  Regardless with her symptoms she will benefit from a hematocrit that is around 50 or less.  We will plan on phlebotomy, I will send the above testing and follow-up after work-up is done.    If she does have a primary polycythemia vera, will add hydroxyurea.      Thank you very much for providing the opportunity to participate in this patient’s care. Please do not hesitate to call if there are any other questions.

## 2023-04-12 ENCOUNTER — PATIENT ROUNDING (BHMG ONLY) (OUTPATIENT)
Dept: ONCOLOGY | Facility: CLINIC | Age: 63
End: 2023-04-12
Payer: MEDICARE

## 2023-04-12 LAB — EPO SERPL-ACNC: 16.4 MIU/ML (ref 2.6–18.5)

## 2023-04-12 NOTE — PROGRESS NOTES
April 12, 2023    Hello, may I speak with Ana Griggs?    My name is Pattie Ro      I am  with [facility] [address] [place] [address] [phone].    Before we get started may I verify your date of birth? 1960    I am calling to officially welcome you to our practice and ask about your recent visit. Is this a good time to talk? no    Tell me about your visit with us. What things went well?  A My Chart message was sent to the patient.       We're always looking for ways to make our patients' experiences even better. Do you have recommendations on ways we may improve?  no    Overall were you satisfied with your first visit to our practice? yes       I appreciate you taking the time to speak with me today. Is there anything else I can do for you? no      Thank you, and have a great day.

## 2023-04-17 LAB — REF LAB TEST METHOD: NORMAL

## 2023-04-18 ENCOUNTER — TELEPHONE (OUTPATIENT)
Dept: CARDIOLOGY | Facility: CLINIC | Age: 63
End: 2023-04-18
Payer: MEDICARE

## 2023-04-18 ENCOUNTER — TELEPHONE (OUTPATIENT)
Dept: ONCOLOGY | Facility: CLINIC | Age: 63
End: 2023-04-18

## 2023-04-18 NOTE — TELEPHONE ENCOUNTER
"  Caller: Ana Griggs \"Anson\"    Relationship: Self    Best call back number: [phone]    What was the call regarding: PATIENT CALLED TO GET LAB RESULTS FROM 4-11-23    Do you require a callback: YES        "

## 2023-04-18 NOTE — TELEPHONE ENCOUNTER
Patient has to cancel her heart cath due to her blood. Her hemoglobin 20.5 on 4/11/2023 she stated that she will call to reschedule. Patient is seeing Dr. Malhotra about this. Patient is wondering when she should see Dr. Ordonez since she is canceling her heart cath.

## 2023-04-19 NOTE — TELEPHONE ENCOUNTER
Patient needs a 6 week follow up with   Mailbox full  Please transfer to office if no availability

## 2023-04-20 NOTE — TELEPHONE ENCOUNTER
Per Dr. Malhotra pt was made aware of lab results and that she should continue her monthly phlebotomies at [facility]. Patient stated she has her next one scheduled for 5/12. She had no there questions.

## 2023-05-23 NOTE — PROGRESS NOTES
Date of Office Visit: 2023  Encounter Provider: Dr. Eugene Ordonez  Place of Service: [facility] CARDIOLOGY [place]  Patient Name: Ana Griggs  :1960  Clay Watson MD    Chief Complaint   Patient presents with   • Atrial Fibrillation   • Congestive Heart Failure   • Hypertension   • Cardiomyopathy   • Diabetes   • Follow-up     History of Present Illness    I am pleased to see Mrs. Griggs in my office today as a follow-up    As you know, patient is 62-year-old white female whose past medical history is significant for hypertension, COPD, chronic atrial fibrillation, cardiomyopathy, who came today for follow-up.    In , patient was evaluated for her symptom of shortness of breath and palpitation.  Patient was noted to be in atrial fibrillation and her echocardiogram was noted to have cardiomyopathy.  Patient was started on rate control and anticoagulation.  It is unclear to me whether patient was ever attempted to have cardioversion by previous cardiologist.  Patient reports that she never had cardioversion.    In , patient underwent echocardiogram which showed moderate left ventricular dysfunction with EF of 40%.  No significant valvular heart disease noted.  Stress test at that time showed no myocardial ischemia or infarction.    In 2022, echocardiogram showed mild left ventricular dysfunction with a EF of 45 to 50%.  No significant valvular heart disease noted.    In 2023, patient was scheduled to proceed with cardiac catheterization but she was noted to have polycythemia.  She was referred to Dr. Malhotra.  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  Cardiac catheterization was canceled for further work-up.    Patient comes today.  She is limited in her activity.  She is short of breath.  Patient is on home oxygen.  Patient is told not to have sleep apnea.  She follows with Dr. Cardenas.  Patient denies any active chest pain.  Patient does complain of occasional  resting retrosternal pain.  It last for few seconds.  Patient is short of breath with exertion.  No orthopnea PND no significant leg edema noted.    EKG showed chronic atrial fibrillation.    At this stage I will add Imdur 30 mg daily.  If patient continues to have chest pain after this therapy I would recommend to proceed with cardiac catheterization.        Past Medical History:   Diagnosis Date   • Anxiety    • Anxiety and depression    • Atrial fibrillation     chronic   • Bronchitis, chronic    • Cardiomegaly    • Cardiomyopathy    • CHF (congestive heart failure)    • COPD (chronic obstructive pulmonary disease)    • Diabetes    • GERD (gastroesophageal reflux disease)    • Hepatitis C    • HTN (hypertension)    • Neuropathy    • Numbness and tingling of both feet    • Pain    • Rheumatoid arthritis          Past Surgical History:   Procedure Laterality Date   • BUNIONECTOMY Bilateral    • COLONOSCOPY N/A 8/15/2019    Procedure: COLONOSCOPY WITH POLYPECTOMY X3;  Surgeon: Lilo Ferguson MD;  Location: [facility] ENDOSCOPY;  Service: Gastroenterology   • DILATATION AND CURETTAGE Bilateral    • FEMUR FRACTURE SURGERY Left    • KNEE ARTHROSCOPY WITH OPEN LIGAMENT REPAIR Left    • POSTPARTUM TUBAL LIGATION Bilateral 1992           Current Outpatient Medications:   •  albuterol sulfate  (90 Base) MCG/ACT inhaler, PROAIR  (90 Base) MCG/ACT AERS, Disp: , Rfl:   •  ALPRAZolam (XANAX) 0.5 MG tablet, ALPRAZOLAM 0.5 MG TABS, Disp: , Rfl:   •  aspirin 81 MG chewable tablet, Chew 1 tablet Daily., Disp: , Rfl:   •  bumetanide (BUMEX) 2 MG tablet, BUMETANIDE 2 MG TABS, Disp: , Rfl:   •  Cholecalciferol (Vitamin D3) 50 MCG (2000 UT) tablet, Take 1 tablet by mouth Daily., Disp: , Rfl:   •  digoxin (LANOXIN) 125 MCG tablet, Take 1 tablet by mouth Daily., Disp: , Rfl:   •  dilTIAZem CD (Cardizem CD) 180 MG 24 hr capsule, Take 1 capsule by mouth Daily., Disp: 180 capsule, Rfl: 1  •  DULoxetine (CYMBALTA) 60 MG capsule,  Take 1 capsule by mouth Daily., Disp: , Rfl:   •  empagliflozin (JARDIANCE) 25 MG tablet tablet, JARDIANCE 25 MG TABS, Disp: , Rfl:   •  fentaNYL (DURAGESIC) 12 MCG/HR, Place 1 patch on the skin as directed by provider Every 72 (Seventy-Two) Hours., Disp: , Rfl:   •  Fluticasone-Umeclidin-Vilant (Trelegy Ellipta) 200-62.5-25 MCG/INH inhaler, Inhale 1 puff Daily., Disp: , Rfl:   •  loratadine (CLARITIN) 10 MG tablet, Daily., Disp: , Rfl:   •  metFORMIN (GLUCOPHAGE) 500 MG tablet, Take 1 tablet by mouth 2 (Two) Times a Day With Meals., Disp: , Rfl:   •  metoprolol succinate XL (TOPROL-XL) 50 MG 24 hr tablet, Take 1 tablet by mouth 2 (Two) Times a Day., Disp: 180 tablet, Rfl: 1  •  multivitamin with minerals tablet tablet, , Disp: , Rfl:   •  oxyCODONE-acetaminophen (PERCOCET)  MG per tablet, Take 1 tablet by mouth Every 6 (Six) Hours As Needed for Moderate Pain., Disp: , Rfl:   •  potassium chloride (K-DUR,KLOR-CON) 20 MEQ CR tablet, Take 1 tablet by mouth Daily., Disp: , Rfl:   •  promethazine (PHENERGAN) 25 MG tablet, Take 1 tablet by mouth 6 (Six) Times a Day., Disp: , Rfl:   •  rivaroxaban (XARELTO) 20 MG tablet, XARELTO 20 MG TABS, Disp: , Rfl:   •  Semaglutide, 1 MG/DOSE, (Ozempic, 1 MG/DOSE,) 2 MG/1.5ML solution pen-injector, Inject  under the skin into the appropriate area as directed 1 (One) Time Per Week., Disp: , Rfl:   •  isosorbide mononitrate (IMDUR) 30 MG 24 hr tablet, Take 1 tablet by mouth Daily., Disp: 90 tablet, Rfl: 3      Social History     Socioeconomic History   • Marital status:    Tobacco Use   • Smoking status: Every Day     Packs/day: 1.50     Years: 45.00     Pack years: 67.50     Types: Cigarettes     Start date: 1/1/1977   • Smokeless tobacco: Never   • Tobacco comments:     Patient is advised to quit smoking   Vaping Use   • Vaping Use: Never used   Substance and Sexual Activity   • Alcohol use: Yes     Comment: occasional   • Drug use: No   • Sexual activity: Not Currently  "        Review of Systems   Constitutional: Negative for chills and fever.   HENT: Negative for ear discharge and nosebleeds.    Eyes: Negative for discharge and redness.   Cardiovascular: Positive for chest pain. Negative for orthopnea, palpitations, paroxysmal nocturnal dyspnea and syncope.   Respiratory: Positive for shortness of breath. Negative for cough and wheezing.    Endocrine: Negative for heat intolerance.   Skin: Negative for rash.   Musculoskeletal: Positive for arthritis, back pain and joint pain. Negative for myalgias.   Gastrointestinal: Negative for abdominal pain, melena, nausea and vomiting.   Genitourinary: Negative for dysuria and hematuria.   Neurological: Negative for dizziness, light-headedness, numbness and tremors.   Psychiatric/Behavioral: Negative for depression. The patient is not nervous/anxious.        Procedures      ECG 12 Lead    Date/Time: 5/24/2023 12:59 PM  Performed by: Eugene Ordonez MD  Authorized by: Eugene Ordonez MD   Comparison: compared with previous ECG   Similar to previous ECG  Rhythm: atrial fibrillation    Clinical impression: abnormal EKG            ECG 12 Lead    (Results Pending)           Objective:    /87 (BP Location: Right arm, Patient Position: Sitting, Cuff Size: Large Adult)   Pulse 99   Ht 170.2 cm (67.01\")   Wt 102 kg (224 lb)   LMP  (LMP Unknown)   SpO2 92%   BMI 35.07 kg/m²         Constitutional:       Appearance: Well-developed.   Eyes:      General: No scleral icterus.        Right eye: No discharge.   HENT:      Head: Normocephalic and atraumatic.   Neck:      Thyroid: No thyromegaly.      Lymphadenopathy: No cervical adenopathy.   Pulmonary:      Effort: Pulmonary effort is normal. No respiratory distress.      Breath sounds: Normal breath sounds. No wheezing. No rales.   Cardiovascular:      Normal rate. Irregularly irregular rhythm.      No gallop.   Abdominal:      Tenderness: There is no abdominal tenderness.   Skin:     Findings: No " erythema or rash.   Neurological:      Mental Status: Alert and oriented to person, place, and time.             Assessment:       Diagnosis Plan   1. Paroxysmal atrial fibrillation  ECG 12 Lead      2. Cardiomyopathy, dilated  ECG 12 Lead      3. Congestive heart failure, unspecified HF chronicity, unspecified heart failure type  ECG 12 Lead      4. Dyspnea on exertion  ECG 12 Lead      5. Benign essential HTN  ECG 12 Lead      6. Type 2 diabetes mellitus without complication, unspecified whether long term insulin use  ECG 12 Lead               Plan:       MDM:    1.  Persistent chronic atrial fibrillation:    Patient is on Xarelto continue current treatment    2.  Cardiomyopathy:    Patient had low EF.  Patient is on metoprolol.  Consider vasodilator in future.  I would consider losartan.    Once the patient is stabilized I would consider cardiac catheterization if she is symptomatic    3.  Congestive heart failure:    Patient has chronic diastolic congestive heart failure.  Currently patient is euvolemic    4.  Hypertension:    Blood pressure is controlled.  Current treatment would be continued    5.  Polycythemia:    Patient has hemoglobin of 20.2.  Patient follows with Dr. Malhotra.

## 2023-05-24 ENCOUNTER — OFFICE VISIT (OUTPATIENT)
Dept: CARDIOLOGY | Facility: CLINIC | Age: 63
End: 2023-05-24
Payer: MEDICARE

## 2023-05-24 VITALS
WEIGHT: 224 LBS | BODY MASS INDEX: 35.16 KG/M2 | SYSTOLIC BLOOD PRESSURE: 135 MMHG | HEIGHT: 67 IN | DIASTOLIC BLOOD PRESSURE: 87 MMHG | HEART RATE: 99 BPM | OXYGEN SATURATION: 92 %

## 2023-05-24 DIAGNOSIS — I50.9 CONGESTIVE HEART FAILURE, UNSPECIFIED HF CHRONICITY, UNSPECIFIED HEART FAILURE TYPE: ICD-10-CM

## 2023-05-24 DIAGNOSIS — E11.9 TYPE 2 DIABETES MELLITUS WITHOUT COMPLICATION, UNSPECIFIED WHETHER LONG TERM INSULIN USE: ICD-10-CM

## 2023-05-24 DIAGNOSIS — I10 BENIGN ESSENTIAL HTN: ICD-10-CM

## 2023-05-24 DIAGNOSIS — I48.0 PAROXYSMAL ATRIAL FIBRILLATION: Primary | ICD-10-CM

## 2023-05-24 DIAGNOSIS — R06.09 DYSPNEA ON EXERTION: ICD-10-CM

## 2023-05-24 DIAGNOSIS — I42.0 CARDIOMYOPATHY, DILATED: ICD-10-CM

## 2023-05-24 RX ORDER — ISOSORBIDE MONONITRATE 30 MG/1
30 TABLET, EXTENDED RELEASE ORAL DAILY
Qty: 90 TABLET | Refills: 3 | Status: SHIPPED | OUTPATIENT
Start: 2023-05-24

## 2023-06-05 ENCOUNTER — TELEPHONE (OUTPATIENT)
Dept: ONCOLOGY | Facility: CLINIC | Age: 63
End: 2023-06-05
Payer: MEDICARE

## 2023-06-05 DIAGNOSIS — Z79.01 LONG TERM CURRENT USE OF ANTICOAGULANT THERAPY: Primary | ICD-10-CM

## 2023-06-05 DIAGNOSIS — D75.1 POLYCYTHEMIA: ICD-10-CM

## 2023-06-05 NOTE — TELEPHONE ENCOUNTER
"  Caller: Ana Griggs \"Anson\"    Relationship: Self    Best call back number: [phone]    What is the best time to reach you: ANYTIME    Who are you requesting to speak with (clinical staff, provider,  specific staff member): CLINICAL     Do you know the name of the person who called: ANA   What was the call regarding: PATIENT WOULD LIKE A ORDER FOR PHLEBOTOMY TO BE SENT TO [facility] ?    CALL TO DISCUSS     Is it okay if the provider responds through MyChart: YES     "

## 2023-06-14 ENCOUNTER — TELEPHONE (OUTPATIENT)
Dept: ONCOLOGY | Facility: CLINIC | Age: 63
End: 2023-06-14
Payer: MEDICARE

## 2023-06-14 NOTE — TELEPHONE ENCOUNTER
----- Message from NALDO Duenas sent at 6/14/2023  2:15 PM EDT -----  Patient will need a phlebotomy done based on the result.  It looks like she has them done at [facility].  Thanks

## 2023-10-26 ENCOUNTER — TELEPHONE (OUTPATIENT)
Dept: ONCOLOGY | Facility: CLINIC | Age: 63
End: 2023-10-26

## 2023-10-26 NOTE — TELEPHONE ENCOUNTER
"    Caller: Ana Griggs \"Anson\"    Relationship to patient: Self    Best call back number: [phone]    Chief complaint:     Type of visit: F/U 1     Requested date: CALL TO R/S     If rescheduling, when is the original appointment: 10/26/2023    Additional notes: PATIENT DID HAVE HER PHLEBOTOMY ON 10/18/2023      "

## 2023-11-09 ENCOUNTER — TELEPHONE (OUTPATIENT)
Dept: CARDIOLOGY | Facility: CLINIC | Age: 63
End: 2023-11-09

## 2023-11-09 NOTE — TELEPHONE ENCOUNTER
Patient Discharge Instructions    Maria Torres / [id number] : 1945    Admitted 2017 Discharged: 11/10/2017 7:16 AM     ACUTE DIAGNOSES:  Arteriovenous fistula infection [facility])    CHRONIC MEDICAL DIAGNOSES:  Problem List as of 11/10/2017  Date Reviewed: 2017          Codes Class Noted - Resolved    Arteriovenous fistula infection ([address] ICD-10-CM: T82. 7XXA  ICD-9-CM: 996.62  2017 - Present        Leukocytosis ICD-10-CM: D72.829  ICD-9-CM: 288.60  2017 - Present        Hypoxia ICD-10-CM: R09.02  ICD-9-CM: 799.02  2017 - Present        Pleural effusion ICD-10-CM: J90  ICD-9-CM: 511.9  2017 - Present        Asthma ICD-10-CM: J45.909  ICD-9-CM: 493.90  Unknown - Present        Hyperlipidemia ICD-10-CM: E78.5  ICD-9-CM: 272.4  Unknown - Present        RAI on CPAP ICD-10-CM: G47.33, Z99.89  ICD-9-CM: 327.23, V46.8  Unknown - Present        Anxiety and depression ICD-10-CM: F41.8  ICD-9-CM: 300.00, 311  Unknown - Present        Chronic bilateral low back pain without sciatica ICD-10-CM: M54.5, G89.29  ICD-9-CM: 724.2, 338.29  11/3/2016 - Present        Generalized anxiety disorder ICD-10-CM: F41.1  ICD-9-CM: 300.02  11/3/2016 - Present        CKD (chronic kidney disease) stage 4, GFR 15-29 ml/min (HCC) (Chronic) ICD-10-CM: N18.4  ICD-9-CM: 585.4  2016 - Present        HTN (hypertension), benign (Chronic) ICD-10-CM: I10  ICD-9-CM: 401.1  2016 - Present        DM type 2, uncontrolled, with renal complications (HCC) (Chronic) ICD-10-CM: E11.29, E11.65  ICD-9-CM: 250.42  2016 - Present        Iron deficiency anemia (Chronic) ICD-10-CM: D50.9  ICD-9-CM: 280.9  2016 - Present        RESOLVED: Hypertension ICD-10-CM: I10  ICD-9-CM: 401.9  Unknown - 2017        RESOLVED: DM type 2 causing renal disease ([facility] ICD-10-CM: E11.29  ICD-9-CM: 250.40  Unknown - 2017        RESOLVED: CKD stage 4 due to type 2 diabetes mellitus ([facility] ICD-10-CM: E11.22, Patient notified   N18.4  ICD-9-CM: 250.40, 585.4  Unknown - 11/2/2017        RESOLVED: Cellulitis of right lower extremity ICD-10-CM: L03.115  ICD-9-CM: 682.6  6/2/2017 - 11/2/2017        RESOLVED: Asthma with acute exacerbation ICD-10-CM: J45.901  ICD-9-CM: 493.92  6/2/2017 - 11/2/2017        RESOLVED: CKD stage 4 due to type 2 diabetes mellitus ([facility] ICD-10-CM: E11.22, N18.4  ICD-9-CM: 250.40, 585.4  Unknown - 2/11/2017        RESOLVED: DM type 2 causing renal disease ([facility] ICD-10-CM: E11.29  ICD-9-CM: 250.40  Unknown - 2/11/2017        RESOLVED: Hypoglycemia ICD-10-CM: E16.2  ICD-9-CM: 251.2  2/11/2017 - 11/2/2017        RESOLVED: Hyperkalemia ICD-10-CM: E87.5  ICD-9-CM: 276.7  11/3/2016 - 2/11/2017        RESOLVED: Elevated serum creatinine ICD-10-CM: R79.89  ICD-9-CM: 790.99  11/3/2016 - 2/11/2017        RESOLVED: Left leg cellulitis ICD-10-CM: L03.116  ICD-9-CM: 682.6  8/14/2016 - 2/11/2017        RESOLVED: Sepsis ([facility] ICD-10-CM: A41.9  ICD-9-CM: 038.9, 995.91  8/14/2016 - 2/11/2017        RESOLVED: Acute renal failure (ARF) ([facility] ICD-10-CM: N17.9  ICD-9-CM: 584.9  8/14/2016 - 11/2/2017        RESOLVED: RAI (obstructive sleep apnea) (Chronic) ICD-10-CM: C76.06  ICD-9-CM: 327.23  8/14/2016 - 2/11/2017              DISCHARGE MEDICATIONS:         · It is important that you take the medication exactly as they are prescribed. · Keep your medication in the bottles provided by the pharmacist and keep a list of the medication names, dosages, and times to be taken in your wallet. · Do not take other medications without consulting your doctor. DIET:  Diabetic Diet    ACTIVITY: Activity as tolerated    ADDITIONAL INFORMATION: If you experience any of the following symptoms then please call your primary care physician or return to the emergency room if you cannot get hold of your doctor: Fever, chills, nausea, vomiting, diarrhea, change in mentation, falling, bleeding, shortness of breath.     FOLLOW UP CARE:  Dr. Pemberton Blayne Campbell MD  you are to call and set up an appointment to see them with in 1 week. Follow-up with specialists at directed by them      Information obtained by :  I understand that if any problems occur once I am at home I am to contact my physician. I understand and acknowledge receipt of the instructions indicated above.                                                                                                                                            Physician's or R.N.'s Signature                                                                  Date/Time                                                                                                                                              Patient or Representative Signature                                                          Date/Time

## 2023-11-09 NOTE — TELEPHONE ENCOUNTER
"    Caller: Ana Griggs \"Anson\"    Relationship to patient: Self    Best call back number: [phone]    Patient is needing: PT PURCHASED A TENS UNIT WHICH HELPS HER WITH SOME BACK PAIN, AFTER READING THE INSTRUCTIONS IT ADVISES HER TO CONTACT HER CARDIOLOGIST IF SHE HAS HEART ISSUES BEFORE USING IT... SHE IS WONDERING IS SHE IS OK TO USE THIS TO HELP WITH HER PAINS WITH HER CURRENT CONDITION.         "

## 2023-11-29 NOTE — PROGRESS NOTES
Date of Office Visit: 2023  Encounter Provider: Dr. Eugene Ordonez  Place of Service: [facility] CARDIOLOGY [place]  Patient Name: Ana Griggs  :1960  Clay Watson MD    Chief Complaint   Patient presents with    Atrial Fibrillation    Hypertension    Follow-up     History of Present Illness    I am pleased to see Mrs. Griggs in my office today as a follow-up    As you know, patient is 63-year-old white female whose past medical history is significant for hypertension, COPD, chronic atrial fibrillation, cardiomyopathy, who came today for follow-up.    In , patient was evaluated for her symptom of shortness of breath and palpitation.  Patient was noted to be in atrial fibrillation and her echocardiogram was noted to have cardiomyopathy.  Patient was started on rate control and anticoagulation.  It is unclear to me whether patient was ever attempted to have cardioversion by previous cardiologist.  Patient reports that she never had cardioversion.    In , patient underwent echocardiogram which showed moderate left ventricular dysfunction with EF of 40%.  No significant valvular heart disease noted.  Stress test at that time showed no myocardial ischemia or infarction.    In 2022, echocardiogram showed mild left ventricular dysfunction with a EF of 45 to 50%.  No significant valvular heart disease noted.    In 2023, patient was scheduled to proceed with cardiac catheterization but she was noted to have polycythemia.  She was referred to Dr. Malhotar.  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  Cardiac catheterization was canceled for further work-up.    Patient came today for follow-up.  She continues to have limited activity.  Patient has advanced COPD.  Patient is diagnosed with sleep apnea and uses CPAP.  Patient does complain of mild shortness of breath.  She continues to have polycythemia with hematocrit greater than 52.  Patient denies any orthopnea PND patient  does have occasional chest pain.    EKG showed atrial fibrillation with heart rate of 124 bpm.  Patient has chronic atrial fibrillation    I would recommend to increase Toprol-XL to 100 mg twice daily.  Once polycythemia is controlled and i hematocrit is less than 46 I would proceed with cardiac catheterization.  However patient is advised to come to the hospital if there is worsening of symptoms continue aspirin        Past Medical History:   Diagnosis Date    Anxiety     Anxiety and depression     Atrial fibrillation     chronic    Bronchitis, chronic     Cardiomegaly     Cardiomyopathy     CHF (congestive heart failure)     COPD (chronic obstructive pulmonary disease)     Diabetes     GERD (gastroesophageal reflux disease)     Hepatitis C     HTN (hypertension)     Neuropathy     Numbness and tingling of both feet     Pain     Rheumatoid arthritis          Past Surgical History:   Procedure Laterality Date    BUNIONECTOMY Bilateral     COLONOSCOPY N/A 8/15/2019    Procedure: COLONOSCOPY WITH POLYPECTOMY X3;  Surgeon: Lilo Ferguson MD;  Location: [facility] ENDOSCOPY;  Service: Gastroenterology    DILATATION AND CURETTAGE Bilateral     FEMUR FRACTURE SURGERY Left     KNEE ARTHROSCOPY WITH OPEN LIGAMENT REPAIR Left     POSTPARTUM TUBAL LIGATION Bilateral 1992           Current Outpatient Medications:     albuterol sulfate  (90 Base) MCG/ACT inhaler, PROAIR  (90 Base) MCG/ACT AERS, Disp: , Rfl:     ALPRAZolam (XANAX) 0.5 MG tablet, ALPRAZOLAM 0.5 MG TABS, Disp: , Rfl:     aspirin 81 MG chewable tablet, Chew 1 tablet Daily., Disp: , Rfl:     bumetanide (BUMEX) 2 MG tablet, BUMETANIDE 2 MG TABS, Disp: , Rfl:     Cholecalciferol (Vitamin D3) 50 MCG (2000 UT) tablet, Take 1 tablet by mouth Daily., Disp: , Rfl:     digoxin (LANOXIN) 125 MCG tablet, Take 1 tablet by mouth Daily., Disp: , Rfl:     dilTIAZem CD (Cardizem CD) 180 MG 24 hr capsule, Take 1 capsule by mouth Daily., Disp: 180 capsule, Rfl: 1     DULoxetine (CYMBALTA) 60 MG capsule, Take 1 capsule by mouth Daily., Disp: , Rfl:     empagliflozin (JARDIANCE) 25 MG tablet tablet, JARDIANCE 25 MG TABS, Disp: , Rfl:     fentaNYL (DURAGESIC) 12 MCG/HR, Place 1 patch on the skin as directed by provider Every 72 (Seventy-Two) Hours., Disp: , Rfl:     Fluticasone-Umeclidin-Vilant (Trelegy Ellipta) 200-62.5-25 MCG/INH inhaler, Inhale 1 puff Daily., Disp: , Rfl:     isosorbide mononitrate (IMDUR) 30 MG 24 hr tablet, Take 1 tablet by mouth Daily., Disp: 90 tablet, Rfl: 3    loratadine (CLARITIN) 10 MG tablet, Daily., Disp: , Rfl:     metFORMIN (GLUCOPHAGE) 500 MG tablet, Take 1 tablet by mouth 2 (Two) Times a Day With Meals., Disp: , Rfl:     multivitamin with minerals tablet tablet, , Disp: , Rfl:     oxyCODONE-acetaminophen (PERCOCET)  MG per tablet, Take 1 tablet by mouth Every 6 (Six) Hours As Needed for Moderate Pain., Disp: , Rfl:     potassium chloride (K-DUR,KLOR-CON) 20 MEQ CR tablet, Take 1 tablet by mouth Daily., Disp: , Rfl:     promethazine (PHENERGAN) 25 MG tablet, Take 1 tablet by mouth 6 (Six) Times a Day., Disp: , Rfl:     rivaroxaban (XARELTO) 20 MG tablet, XARELTO 20 MG TABS, Disp: , Rfl:     Semaglutide, 1 MG/DOSE, (Ozempic, 1 MG/DOSE,) 2 MG/1.5ML solution pen-injector, Inject  under the skin into the appropriate area as directed 1 (One) Time Per Week., Disp: , Rfl:     varenicline (CHANTIX) 1 MG tablet, Take 1 tablet by mouth 2 (Two) Times a Day., Disp: , Rfl:     metoprolol succinate XL (Toprol XL) 100 MG 24 hr tablet, Take 1 tablet by mouth 2 (Two) Times a Day., Disp: 180 tablet, Rfl: 1      Social History     Socioeconomic History    Marital status:    Tobacco Use    Smoking status: Every Day     Packs/day: 1.50     Years: 45.00     Additional pack years: 0.00     Total pack years: 67.50     Types: Cigarettes     Start date: 1/1/1977    Smokeless tobacco: Never    Tobacco comments:     Patient is advised to quit smoking   Vaping Use  "   Vaping Use: Never used   Substance and Sexual Activity    Alcohol use: Yes     Comment: occasional    Drug use: No    Sexual activity: Not Currently         Review of Systems   Constitutional: Negative for chills and fever.   HENT:  Negative for ear discharge and nosebleeds.    Eyes:  Negative for discharge and redness.   Cardiovascular:  Positive for chest pain. Negative for orthopnea, palpitations, paroxysmal nocturnal dyspnea and syncope.   Respiratory:  Positive for shortness of breath. Negative for cough and wheezing.    Endocrine: Negative for heat intolerance.   Skin:  Negative for rash.   Musculoskeletal:  Positive for arthritis and joint pain. Negative for myalgias.   Gastrointestinal:  Negative for abdominal pain, melena, nausea and vomiting.   Genitourinary:  Negative for dysuria and hematuria.   Neurological:  Negative for dizziness, light-headedness, numbness and tremors.   Psychiatric/Behavioral:  Negative for depression. The patient is not nervous/anxious.        Procedures      ECG 12 Lead    Date/Time: 12/3/2023 1:52 PM  Performed by: Eugene Ordonez MD    Authorized by: Eugene Ordonez MD  Comparison: compared with previous ECG   Similar to previous ECG  Rhythm: atrial fibrillation  Other findings: non-specific ST-T wave changes    Clinical impression: abnormal EKG          ECG 12 Lead    (Results Pending)           Objective:    /82 (BP Location: Right arm, Patient Position: Sitting, Cuff Size: Large Adult)   Pulse (!) 124   Resp 18   Ht 170.2 cm (67.01\")   Wt 102 kg (225 lb)   LMP  (LMP Unknown)   SpO2 93%   BMI 35.23 kg/m²         Constitutional:       Appearance: Well-developed.   Eyes:      General: No scleral icterus.        Right eye: No discharge.   HENT:      Head: Normocephalic and atraumatic.   Neck:      Thyroid: No thyromegaly.      Lymphadenopathy: No cervical adenopathy.   Pulmonary:      Effort: Pulmonary effort is normal. No respiratory distress.      Breath sounds: " Normal breath sounds. No wheezing. No rales.   Cardiovascular:      Normal rate. Irregularly irregular rhythm.      No gallop.    Edema:     Peripheral edema absent.   Abdominal:      Tenderness: There is no abdominal tenderness.   Skin:     Findings: No erythema or rash.   Neurological:      Mental Status: Alert and oriented to person, place, and time.             Assessment:       Diagnosis Plan   1. Paroxysmal atrial fibrillation  ECG 12 Lead    Stress Test With Myocardial Perfusion One Day      2. Cardiomyopathy, dilated  ECG 12 Lead    Stress Test With Myocardial Perfusion One Day      3. Congestive heart failure, unspecified HF chronicity, unspecified heart failure type  ECG 12 Lead    Stress Test With Myocardial Perfusion One Day      4. Precordial chest pain  Stress Test With Myocardial Perfusion One Day               Plan:       MDM:    1.  Chest pain:    Patient has atypical chest pain.  Stress test in 2019 was negative.  She continues to have chest pain.  Cardiac catheterization could not be done because of very high hematocrit.  Risk of bleeding would be high.  As soon as polycythemia is controlled I will proceed with cardiac catheterization however in the meanwhile I would recommend to proceed with stress test with Cardiolite imaging    2.  Congestive heart failure:    Patient is in euvolemic status.  Continue Bumex at current dose    3.  Dilated cardiomyopathy:    Patient has dilated cardiomyopathy needs cardiac catheterization patient is on diltiazem and Toprol-XL    4.  Chronic atrial fibrillation:    Patient rate is not well controlled I will increase Toprol-XL to 100 mg twice daily.  Continue Xarelto

## 2023-11-30 ENCOUNTER — OFFICE VISIT (OUTPATIENT)
Dept: CARDIOLOGY | Facility: CLINIC | Age: 63
End: 2023-11-30
Payer: MEDICARE

## 2023-11-30 VITALS
OXYGEN SATURATION: 93 % | BODY MASS INDEX: 35.31 KG/M2 | DIASTOLIC BLOOD PRESSURE: 82 MMHG | RESPIRATION RATE: 18 BRPM | SYSTOLIC BLOOD PRESSURE: 129 MMHG | WEIGHT: 225 LBS | HEART RATE: 124 BPM | HEIGHT: 67 IN

## 2023-11-30 DIAGNOSIS — I48.0 PAROXYSMAL ATRIAL FIBRILLATION: Primary | ICD-10-CM

## 2023-11-30 DIAGNOSIS — I50.9 CONGESTIVE HEART FAILURE, UNSPECIFIED HF CHRONICITY, UNSPECIFIED HEART FAILURE TYPE: ICD-10-CM

## 2023-11-30 DIAGNOSIS — I42.0 CARDIOMYOPATHY, DILATED: ICD-10-CM

## 2023-11-30 DIAGNOSIS — R07.2 PRECORDIAL CHEST PAIN: ICD-10-CM

## 2023-11-30 RX ORDER — VARENICLINE TARTRATE 1 MG/1
1 TABLET, FILM COATED ORAL 2 TIMES DAILY
COMMUNITY

## 2023-12-01 ENCOUNTER — TELEPHONE (OUTPATIENT)
Dept: CARDIOLOGY | Facility: CLINIC | Age: 63
End: 2023-12-01

## 2023-12-01 NOTE — TELEPHONE ENCOUNTER
"  Caller: Ana Griggs \"Anson\"    Relationship: Self    Best call back number: [phone]    What is the best time to reach you: ANYTIME    Who are you requesting to speak with (clinical staff, provider,  specific staff member): CLINICAL    What was the call regarding: PT IS CALLING TO SEE IF DR. MOTT WAS GOING TO SEND IN PRESCRIPTIONS TO [facility] IN [place], [place]    Is it okay if the provider responds through MyChart: NO      "

## 2023-12-01 NOTE — TELEPHONE ENCOUNTER
"  Caller: Ana Griggs \"Anson\"    Relationship: Self    Best call back number: [phone]    What is the best time to reach you: ANYTIME    Who are you requesting to speak with (clinical staff, provider,  specific staff member): CLINICAL    What was the call regarding: PT IS CALLING BACK ABOUT PREVIOUS ISSUE    Is it okay if the provider responds through MyChart: NO      "

## 2023-12-02 RX ORDER — METOPROLOL SUCCINATE 100 MG/1
100 TABLET, EXTENDED RELEASE ORAL 2 TIMES DAILY
Qty: 180 TABLET | Refills: 1 | Status: SHIPPED | OUTPATIENT
Start: 2023-12-02

## 2023-12-06 ENCOUNTER — TELEPHONE (OUTPATIENT)
Dept: CARDIOLOGY | Facility: CLINIC | Age: 63
End: 2023-12-06

## 2023-12-06 NOTE — TELEPHONE ENCOUNTER
Patient states she was only taking toprol 50 mg   she was not taking it does a day so patient will increase toprol 100 daily

## 2023-12-06 NOTE — TELEPHONE ENCOUNTER
"  Caller: Ana Griggs \"Anson\"    Relationship: Self    Best call back number: [phone] (home)      What is the best time to reach you: ANYTIME     Who are you requesting to speak with (clinical staff, provider,  specific staff member): DR MOTT     What was the call regarding: PT CALLED IN STATING DURING HER APPT WITH DR MOTT ON 11.30.23 HE HAD STATED HE WAS GOING TO UP HER METOPROLOL MEDICATION, PT STATES SHE HAD BEEN TAKING METOMROLOL 50MG ONCE A DAY, BUT BELIEVES DR MOTT HAD IN THE CHART SHE WAS TAKING METOPROLOL 50MG TWICE A DAY, HE WAS WANTING TO DOUBLE THE MEDICATION, SO HE PRESCRIBED METOPROLOL 100MG TWICE A DAY SINCE HE THOUGHT SHE HAD BEEN TAKING 50MG TWICE A DAY, BUT PT STATES SHE HAD ONLY BEEN TAKING THE 50MG ONCE A DAY SO SHE BELIEVES SHE SHOULD ONLY BE TAKING 100MG ONCE A DAY NOW, SHE JUST WANTS TO BE SURE OF THE DOSAGE AND WANTS CLARITY WHERE SHE BELIEVES HE HAD NOT KNOWN THE ORIGINAL DOSAGE SHE NORMALLY TOOK.     Is it okay if the provider responds through MyChart: YES         "

## 2023-12-19 NOTE — PROGRESS NOTES
HEMATOLOGY ONCOLOGY OUTPATIENT FOLLOWUP       Patient name: Ana Griggs  : 1960  MRN: [id number]  Primary Care Physician: Clay Watson MD  Referring Physician: Clay Watson,*  Reason For Consult: erythrocytosis      History of Present Illness:  Patient is a 63 y.o. female with longstanding history of smoking who was referred for work-up of elevated hemoglobin.  Patient was previously seen in 2019 for erythrocytosis at that point she had a JAK2 V6 1 7 mutation analysis which was negative.  She also had a BCR-ABL mutational analysis which was negative.  Patient had phlebotomy at that point.  She was then lost to follow-up.    3/25/2023 -WBC 9.24, hemoglobin 20.5, platelet 236 hct 59  Monthly phlebotomy  2023 - Hb 17.5, hct 53.6   Continued monthly phlebotomy  2023 - Hct 51 phlebotomy      Subjective:  Continue to smoke but has cut down significantly, no other new symptoms.       Past Medical History:   Diagnosis Date    Anxiety     Anxiety and depression     Atrial fibrillation     chronic    Bronchitis, chronic     Cardiomegaly     Cardiomyopathy     CHF (congestive heart failure)     COPD (chronic obstructive pulmonary disease)     Diabetes     GERD (gastroesophageal reflux disease)     Hepatitis C     HTN (hypertension)     Neuropathy     Numbness and tingling of both feet     Pain     Rheumatoid arthritis        Past Surgical History:   Procedure Laterality Date    BUNIONECTOMY Bilateral     COLONOSCOPY N/A 8/15/2019    Procedure: COLONOSCOPY WITH POLYPECTOMY X3;  Surgeon: Lilo Ferguson MD;  Location: [facility] ENDOSCOPY;  Service: Gastroenterology    DILATATION AND CURETTAGE Bilateral     FEMUR FRACTURE SURGERY Left     KNEE ARTHROSCOPY WITH OPEN LIGAMENT REPAIR Left     POSTPARTUM TUBAL LIGATION Bilateral          Current Outpatient Medications:     albuterol sulfate  (90 Base) MCG/ACT inhaler, PROAIR  (90 Base) MCG/ACT AERS,  Disp: , Rfl:     ALPRAZolam (XANAX) 0.5 MG tablet, ALPRAZOLAM 0.5 MG TABS, Disp: , Rfl:     aspirin 81 MG chewable tablet, Chew 1 tablet Daily., Disp: , Rfl:     bumetanide (BUMEX) 2 MG tablet, BUMETANIDE 2 MG TABS, Disp: , Rfl:     Cholecalciferol (Vitamin D3) 50 MCG (2000 UT) tablet, Take 1 tablet by mouth Daily., Disp: , Rfl:     digoxin (LANOXIN) 125 MCG tablet, Take 1 tablet by mouth Daily., Disp: , Rfl:     dilTIAZem CD (Cardizem CD) 180 MG 24 hr capsule, Take 1 capsule by mouth Daily., Disp: 180 capsule, Rfl: 1    DULoxetine (CYMBALTA) 60 MG capsule, Take 1 capsule by mouth Daily., Disp: , Rfl:     empagliflozin (JARDIANCE) 25 MG tablet tablet, JARDIANCE 25 MG TABS, Disp: , Rfl:     fentaNYL (DURAGESIC) 12 MCG/HR, Place 1 patch on the skin as directed by provider Every 72 (Seventy-Two) Hours., Disp: , Rfl:     Fluticasone-Umeclidin-Vilant (Trelegy Ellipta) 200-62.5-25 MCG/INH inhaler, Inhale 1 puff Daily., Disp: , Rfl:     isosorbide mononitrate (IMDUR) 30 MG 24 hr tablet, Take 1 tablet by mouth Daily., Disp: 90 tablet, Rfl: 3    loratadine (CLARITIN) 10 MG tablet, Daily., Disp: , Rfl:     metFORMIN (GLUCOPHAGE) 500 MG tablet, Take 1 tablet by mouth 2 (Two) Times a Day With Meals., Disp: , Rfl:     metoprolol succinate XL (Toprol XL) 100 MG 24 hr tablet, Take 1 tablet by mouth 2 (Two) Times a Day., Disp: 180 tablet, Rfl: 1    multivitamin with minerals tablet tablet, , Disp: , Rfl:     oxyCODONE-acetaminophen (PERCOCET)  MG per tablet, Take 1 tablet by mouth Every 6 (Six) Hours As Needed for Moderate Pain., Disp: , Rfl:     potassium chloride (K-DUR,KLOR-CON) 20 MEQ CR tablet, Take 1 tablet by mouth Daily., Disp: , Rfl:     promethazine (PHENERGAN) 25 MG tablet, Take 1 tablet by mouth 6 (Six) Times a Day., Disp: , Rfl:     rivaroxaban (XARELTO) 20 MG tablet, XARELTO 20 MG TABS, Disp: , Rfl:     Semaglutide, 1 MG/DOSE, (Ozempic, 1 MG/DOSE,) 2 MG/1.5ML solution pen-injector, Inject  under the skin into  "the appropriate area as directed 1 (One) Time Per Week., Disp: , Rfl:     varenicline (CHANTIX) 1 MG tablet, Take 1 tablet by mouth 2 (Two) Times a Day., Disp: , Rfl:     Allergies   Allergen Reactions    Medrol [Methylprednisolone] Swelling     Dose Julio only       Family History   Problem Relation Age of Onset    No Known Problems Mother     No Known Problems Father     Breast cancer Sister        Cancer-related family history includes Breast cancer in her sister.      Social History     Tobacco Use    Smoking status: Every Day     Packs/day: 1.50     Years: 45.00     Additional pack years: 0.00     Total pack years: 67.50     Types: Cigarettes     Start date: 1/1/1977    Smokeless tobacco: Never    Tobacco comments:     Patient is advised to quit smoking   Vaping Use    Vaping Use: Never used   Substance Use Topics    Alcohol use: Yes     Comment: occasional    Drug use: No     Social History     Social History Narrative    Not on file     Objective:    Vital Signs:  Vitals:    12/21/23 1343   BP: 101/69   Pulse: 94   Resp: 18   Temp: 98.2 °F (36.8 °C)   SpO2: 94%   Weight: 104 kg (229 lb 6.4 oz)   Height: 170.2 cm (67\")   PainSc: 0-No pain       Body mass index is 35.93 kg/m².    ECOG  (0) Fully active, able to carry on all predisease performance without restriction    Physical Exam:   Physical Exam  Constitutional:       Appearance: Normal appearance.   HENT:      Head: Normocephalic and atraumatic.      Mouth/Throat:      Mouth: Mucous membranes are moist.   Eyes:      Pupils: Pupils are equal, round, and reactive to light.   Cardiovascular:      Rate and Rhythm: Normal rate and regular rhythm.      Pulses: Normal pulses.      Heart sounds: No murmur heard.  Pulmonary:      Effort: Pulmonary effort is normal.      Breath sounds: Normal breath sounds.   Abdominal:      General: There is no distension.      Palpations: Abdomen is soft. There is no mass.      Tenderness: There is no abdominal tenderness. " "  Musculoskeletal:         General: Normal range of motion.      Cervical back: Normal range of motion and neck supple.   Skin:     General: Skin is warm.   Neurological:      General: No focal deficit present.      Mental Status: She is alert.   Psychiatric:         Mood and Affect: Mood normal.         Lab Results - Last 18 Months   Lab Units 04/11/23  1136 03/25/23  1140   WBC 10*3/mm3 9.10 9.24   HEMOGLOBIN g/dL 20.5* 20.5*   HEMATOCRIT % 59.2* 59.0*   PLATELETS 10*3/mm3 204 236   MCV fL 94.1 91.9     Lab Results - Last 18 Months   Lab Units 03/25/23  1140   SODIUM mmol/L 137   POTASSIUM mmol/L 4.5   CHLORIDE mmol/L 96*   CO2 mmol/L 30.5*   BUN mg/dL 13   CREATININE mg/dL 0.89   CALCIUM mg/dL 10.4   GLUCOSE mg/dL 111*       Lab Results   Component Value Date    GLUCOSE 111 (H) 03/25/2023    BUN 13 03/25/2023    CREATININE 0.89 03/25/2023    EGFRIFNONA 103 07/01/2019    BCR 14.6 03/25/2023    K 4.5 03/25/2023    CO2 30.5 (H) 03/25/2023    CALCIUM 10.4 03/25/2023    ALBUMIN 3.60 07/01/2019    LABIL2 1.0 03/12/2019    AST 23 07/01/2019    ALT 26 07/01/2019       Lab Results - Last 18 Months   Lab Units 03/25/23  1140   INR  1.26*   APTT seconds 39.4*       No results found for: \"IRON\", \"TIBC\", \"FERRITIN\"    No results found for: \"FOLATE\"    No results found for: \"OCCULTBLD\"    No results found for: \"RETICCTPCT\"  No results found for: \"KJKVBDKF91\"  No results found for: \"SPEP\", \"UPEP\"  No results found for: \"LDH\", \"URICACID\"  No results found for: \"JOSELIN\", \"RF\", \"SEDRATE\"  No results found for: \"FIBRINOGEN\", \"HAPTOGLOBIN\"  Lab Results   Component Value Date    PTT 39.4 (H) 03/25/2023    INR 1.26 (H) 03/25/2023     No results found for: \"\"  No results found for: \"CEA\"  No components found for: \"CA-19-9\"  No results found for: \"PSA\"  No results found for: \"SEDRATE\"       Assessment & Plan     Patient is a 62-year-old female with history of secondary polycythemia    Secondary polycythemia  This is presumed secondary " polycythemia however erythropoietin, JAK2 exon 12 mutation negative  Erythropoetin not suppressed hence goes along with secondary  cause  Regardless with her symptoms she will benefit from a hematocrit that is around 50 or less.  Now hct is down to 51, still getting phlebotomy.  Continue monthly for now for hct >50    Smoking   Counseled again, working on it  She will get CT low dose screening    F/u in 4 months with cbc    Thank you very much for providing the opportunity to participate in this patient’s care. Please do not hesitate to call if there are any other questions.

## 2023-12-21 ENCOUNTER — OFFICE VISIT (OUTPATIENT)
Dept: ONCOLOGY | Facility: CLINIC | Age: 63
End: 2023-12-21
Payer: MEDICARE

## 2023-12-21 VITALS
HEIGHT: 67 IN | HEART RATE: 94 BPM | OXYGEN SATURATION: 94 % | BODY MASS INDEX: 36 KG/M2 | SYSTOLIC BLOOD PRESSURE: 101 MMHG | WEIGHT: 229.4 LBS | DIASTOLIC BLOOD PRESSURE: 69 MMHG | TEMPERATURE: 98.2 F | RESPIRATION RATE: 18 BRPM

## 2023-12-21 DIAGNOSIS — D75.1 POLYCYTHEMIA: Primary | ICD-10-CM

## 2023-12-21 DIAGNOSIS — Z79.01 LONG TERM CURRENT USE OF ANTICOAGULANT THERAPY: ICD-10-CM

## 2024-01-09 RX ORDER — METOPROLOL SUCCINATE 100 MG/1
100 TABLET, EXTENDED RELEASE ORAL 2 TIMES DAILY
Qty: 180 TABLET | Refills: 1 | Status: SHIPPED | OUTPATIENT
Start: 2024-01-09

## 2024-01-09 NOTE — TELEPHONE ENCOUNTER
"  Caller: Ana Griggs \"Anson\"    Relationship: Self    Best call back number: [phone] (home)      Requested Prescriptions:   Requested Prescriptions     Pending Prescriptions Disp Refills    metoprolol succinate XL (Toprol XL) 100 MG 24 hr tablet 180 tablet 1     Sig: Take 1 tablet by mouth 2 (Two) Times a Day.        Pharmacy where request should be sent: [facility], [place] - [address] [phone] [place] [phone]      Last office visit with prescribing clinician: 11/30/2023   Last telemedicine visit with prescribing clinician: Visit date not found   Next office visit with prescribing clinician: 3/28/2024     Additional details provided by patient: MEDICATION SHOULD SAY 100MG ONCE DAILY, THE PRESCRIPTION THEY HAVE NOW SAYS 100MG TWICE DAILY AND BASED OFF TE SHOULD ONLY BE ONCE DAILY PT STATES. PLEASE ADVISE AND SEND IN NEW PRESCRIPTION.     Does the patient have less than a 3 day supply:  [] Yes  [x] No    Would you like a call back once the refill request has been completed: [x] Yes [] No    If the office needs to give you a call back, can they leave a voicemail: [x] Yes [] No    Chemo [place] Rep   01/09/24 13:14 EST       "

## 2024-01-17 ENCOUNTER — TELEPHONE (OUTPATIENT)
Dept: CARDIOLOGY | Facility: CLINIC | Age: 64
End: 2024-01-17
Payer: MEDICARE

## 2024-01-17 RX ORDER — METOPROLOL SUCCINATE 100 MG/1
100 TABLET, EXTENDED RELEASE ORAL DAILY
Qty: 90 TABLET | Refills: 1 | Status: SHIPPED | OUTPATIENT
Start: 2024-01-17

## 2024-01-17 NOTE — TELEPHONE ENCOUNTER
"    Caller: Ana Griggs \"Anson\"    Relationship: Self    Best call back number: [phone]    Which medication are you concerned about: METOPROLOL    Who prescribed you this medication: DR MOTT    When did you start taking this medication: 12/5/23 THERE WAS MEDICATION CHANGE     What are your concerns: MEDICATION SHOULD SAY 100MG ONCE DAILY, THE PRESCRIPTION THEY HAVE NOW SAYS 100MG TWICE DAILY. PLEASE SEND CORRECT DOSAGE TO PHARMACY FOR REFILL. PATIENT ONLY TAKES THIS MEDICATION ONCE A DAY. PLEASE REACH OUT TO PATIENT FOR CONSULTATION.    How long have you had these concerns: 2 MONTHS    "

## 2024-02-02 DIAGNOSIS — I48.0 PAROXYSMAL ATRIAL FIBRILLATION: ICD-10-CM

## 2024-02-02 DIAGNOSIS — I10 BENIGN ESSENTIAL HTN: ICD-10-CM

## 2024-02-02 DIAGNOSIS — I42.0 CARDIOMYOPATHY, DILATED: ICD-10-CM

## 2024-02-02 DIAGNOSIS — I50.9 CONGESTIVE HEART FAILURE, UNSPECIFIED HF CHRONICITY, UNSPECIFIED HEART FAILURE TYPE: ICD-10-CM

## 2024-02-02 DIAGNOSIS — E11.9 TYPE 2 DIABETES MELLITUS WITHOUT COMPLICATION, UNSPECIFIED WHETHER LONG TERM INSULIN USE: ICD-10-CM

## 2024-02-02 RX ORDER — DILTIAZEM HYDROCHLORIDE 180 MG/1
180 CAPSULE, COATED, EXTENDED RELEASE ORAL DAILY
Qty: 180 CAPSULE | Refills: 1 | Status: SHIPPED | OUTPATIENT
Start: 2024-02-02

## 2024-02-14 ENCOUNTER — TELEPHONE (OUTPATIENT)
Dept: CARDIOLOGY | Facility: CLINIC | Age: 64
End: 2024-02-14
Payer: MEDICARE

## 2024-03-28 RX ORDER — ISOSORBIDE MONONITRATE 30 MG/1
30 TABLET, EXTENDED RELEASE ORAL
Qty: 90 TABLET | Refills: 0 | Status: SHIPPED | OUTPATIENT
Start: 2024-03-28

## 2024-04-25 ENCOUNTER — OFFICE VISIT (OUTPATIENT)
Dept: ONCOLOGY | Facility: CLINIC | Age: 64
End: 2024-04-25
Payer: MEDICARE

## 2024-04-25 VITALS
HEART RATE: 101 BPM | RESPIRATION RATE: 16 BRPM | TEMPERATURE: 98.4 F | DIASTOLIC BLOOD PRESSURE: 72 MMHG | OXYGEN SATURATION: 97 % | BODY MASS INDEX: 35.94 KG/M2 | SYSTOLIC BLOOD PRESSURE: 109 MMHG | HEIGHT: 67 IN | WEIGHT: 229 LBS

## 2024-04-25 DIAGNOSIS — D75.1 POLYCYTHEMIA: Primary | ICD-10-CM

## 2024-04-25 DIAGNOSIS — F17.200 SMOKER: ICD-10-CM

## 2024-04-25 DIAGNOSIS — Z87.891 PERSONAL HISTORY OF NICOTINE DEPENDENCE: ICD-10-CM

## 2024-04-25 NOTE — PROGRESS NOTES
HEMATOLOGY ONCOLOGY OUTPATIENT FOLLOWUP       Patient name: Ana Griggs  : 1960  MRN: [id number]  Primary Care Physician: Clay Watson MD  Referring Physician: Clay Watson,*  Reason For Consult: erythrocytosis      History of Present Illness:  Patient is a 63 y.o. female with longstanding history of smoking who was referred for work-up of elevated hemoglobin.  Patient was previously seen in 2019 for erythrocytosis at that point she had a JAK2 V6 1 7 mutation analysis which was negative.  She also had a BCR-ABL mutational analysis which was negative.  Patient had phlebotomy at that point.  She was then lost to follow-up.    3/25/2023 -WBC 9.24, hemoglobin 20.5, platelet 236 hct 59  Monthly phlebotomy  2023 - Hb 17.5, hct 53.6   Continued monthly phlebotomy  2023 - Hct 51 phlebotomy  Needed phlebotomy in 2024. 3/2024  2024 and 2024 did not need    Subjective:  Patient continues to smoke, trying to cut down. Has needed every other month phlebotomy      Past Medical History:   Diagnosis Date    Anxiety     Anxiety and depression     Atrial fibrillation     chronic    Bronchitis, chronic     Cardiomegaly     Cardiomyopathy     CHF (congestive heart failure)     COPD (chronic obstructive pulmonary disease)     Diabetes     GERD (gastroesophageal reflux disease)     Hepatitis C     HTN (hypertension)     Neuropathy     Numbness and tingling of both feet     Pain     Rheumatoid arthritis        Past Surgical History:   Procedure Laterality Date    BUNIONECTOMY Bilateral     COLONOSCOPY N/A 8/15/2019    Procedure: COLONOSCOPY WITH POLYPECTOMY X3;  Surgeon: Lilo Ferguson MD;  Location: [facility];  Service: Gastroenterology    DILATATION AND CURETTAGE Bilateral     FEMUR FRACTURE SURGERY Left     KNEE ARTHROSCOPY WITH OPEN LIGAMENT REPAIR Left     POSTPARTUM TUBAL LIGATION Bilateral          Current Outpatient Medications:     albuterol  sulfate  (90 Base) MCG/ACT inhaler, PROAIR  (90 Base) MCG/ACT AERS, Disp: , Rfl:     ALPRAZolam (XANAX) 0.5 MG tablet, ALPRAZOLAM 0.5 MG TABS, Disp: , Rfl:     aspirin 81 MG chewable tablet, Chew 1 tablet Daily., Disp: , Rfl:     bumetanide (BUMEX) 2 MG tablet, BUMETANIDE 2 MG TABS, Disp: , Rfl:     Cholecalciferol (Vitamin D3) 50 MCG (2000 UT) tablet, Take 1 tablet by mouth Daily., Disp: , Rfl:     digoxin (LANOXIN) 125 MCG tablet, Take 1 tablet by mouth Daily., Disp: , Rfl:     dilTIAZem CD (CARDIZEM CD) 180 MG 24 hr capsule, TAKE ONE CAPSULE BY MOUTH EVERY DAY, Disp: 180 capsule, Rfl: 1    DULoxetine (CYMBALTA) 60 MG capsule, Take 1 capsule by mouth Daily., Disp: , Rfl:     empagliflozin (JARDIANCE) 25 MG tablet tablet, JARDIANCE 25 MG TABS, Disp: , Rfl:     fentaNYL (DURAGESIC) 12 MCG/HR, Place 1 patch on the skin as directed by provider Every 72 (Seventy-Two) Hours., Disp: , Rfl:     Fluticasone-Umeclidin-Vilant (Trelegy Ellipta) 200-62.5-25 MCG/INH inhaler, Inhale 1 puff Daily., Disp: , Rfl:     isosorbide mononitrate (IMDUR) 30 MG 24 hr tablet, TAKE ONE TABLET BY MOUTH AT BEDTIME, Disp: 90 tablet, Rfl: 0    loratadine (CLARITIN) 10 MG tablet, Daily., Disp: , Rfl:     metFORMIN (GLUCOPHAGE) 500 MG tablet, Take 1 tablet by mouth 2 (Two) Times a Day With Meals., Disp: , Rfl:     metoprolol succinate XL (Toprol XL) 100 MG 24 hr tablet, Take 1 tablet by mouth Daily., Disp: 90 tablet, Rfl: 1    multivitamin with minerals tablet tablet, , Disp: , Rfl:     oxyCODONE-acetaminophen (PERCOCET)  MG per tablet, Take 1 tablet by mouth Every 6 (Six) Hours As Needed for Moderate Pain., Disp: , Rfl:     potassium chloride (K-DUR,KLOR-CON) 20 MEQ CR tablet, Take 1 tablet by mouth Daily., Disp: , Rfl:     promethazine (PHENERGAN) 25 MG tablet, Take 1 tablet by mouth 6 (Six) Times a Day., Disp: , Rfl:     rivaroxaban (XARELTO) 20 MG tablet, XARELTO 20 MG TABS, Disp: , Rfl:     Semaglutide, 1 MG/DOSE,  "(Ozempic, 1 MG/DOSE,) 2 MG/1.5ML solution pen-injector, Inject  under the skin into the appropriate area as directed 1 (One) Time Per Week., Disp: , Rfl:     varenicline (CHANTIX) 1 MG tablet, Take 1 tablet by mouth 2 (Two) Times a Day., Disp: , Rfl:     Allergies   Allergen Reactions    Medrol [Methylprednisolone] Swelling     Dose Julio only       Family History   Problem Relation Age of Onset    No Known Problems Mother     No Known Problems Father     Breast cancer Sister        Cancer-related family history includes Breast cancer in her sister.      Social History     Tobacco Use    Smoking status: Every Day     Current packs/day: 1.50     Average packs/day: 1.5 packs/day for 47.3 years (71.0 ttl pk-yrs)     Types: Cigarettes     Start date: 1/1/1977    Smokeless tobacco: Never    Tobacco comments:     Patient is advised to quit smoking   Vaping Use    Vaping status: Never Used   Substance Use Topics    Alcohol use: Yes     Comment: occasional    Drug use: No     Social History     Social History Narrative    Not on file     Objective:    Vital Signs:  Vitals:    04/25/24 1340   BP: 109/72   Pulse: 101   Resp: 16   Temp: 98.4 °F (36.9 °C)   SpO2: 97%   Weight: 104 kg (229 lb)   Height: 170.2 cm (67\")   PainSc: 0-No pain       Body mass index is 35.87 kg/m².    ECOG  (0) Fully active, able to carry on all predisease performance without restriction    Physical Exam:   Physical Exam  Constitutional:       Appearance: Normal appearance.   HENT:      Head: Normocephalic and atraumatic.      Mouth/Throat:      Mouth: Mucous membranes are moist.   Eyes:      Pupils: Pupils are equal, round, and reactive to light.   Cardiovascular:      Rate and Rhythm: Normal rate and regular rhythm.      Pulses: Normal pulses.      Heart sounds: No murmur heard.  Pulmonary:      Effort: Pulmonary effort is normal.      Breath sounds: Normal breath sounds.   Abdominal:      General: There is no distension.      Palpations: Abdomen is " "soft. There is no mass.      Tenderness: There is no abdominal tenderness.   Musculoskeletal:         General: Normal range of motion.      Cervical back: Normal range of motion and neck supple.   Skin:     General: Skin is warm.   Neurological:      General: No focal deficit present.      Mental Status: She is alert.   Psychiatric:         Mood and Affect: Mood normal.         Lab Results - Last 18 Months   Lab Units 04/11/23  1136 03/25/23  1140   WBC 10*3/mm3 9.10 9.24   HEMOGLOBIN g/dL 20.5* 20.5*   HEMATOCRIT % 59.2* 59.0*   PLATELETS 10*3/mm3 204 236   MCV fL 94.1 91.9     Lab Results - Last 18 Months   Lab Units 03/25/23  1140   SODIUM mmol/L 137   POTASSIUM mmol/L 4.5   CHLORIDE mmol/L 96*   CO2 mmol/L 30.5*   BUN mg/dL 13   CREATININE mg/dL 0.89   CALCIUM mg/dL 10.4   GLUCOSE mg/dL 111*       Lab Results   Component Value Date    GLUCOSE 111 (H) 03/25/2023    BUN 13 03/25/2023    CREATININE 0.89 03/25/2023    EGFRIFNONA 103 07/01/2019    BCR 14.6 03/25/2023    K 4.5 03/25/2023    CO2 30.5 (H) 03/25/2023    CALCIUM 10.4 03/25/2023    ALBUMIN 3.60 07/01/2019    LABIL2 1.0 03/12/2019    AST 23 07/01/2019    ALT 26 07/01/2019       Lab Results - Last 18 Months   Lab Units 03/25/23  1140   INR  1.26*   APTT seconds 39.4*       No results found for: \"IRON\", \"TIBC\", \"FERRITIN\"    No results found for: \"FOLATE\"    No results found for: \"OCCULTBLD\"    No results found for: \"RETICCTPCT\"  No results found for: \"[id number]\"  No results found for: \"SPEP\", \"UPEP\"  No results found for: \"LDH\", \"URICACID\"  No results found for: \"JOSELIN\", \"RF\", \"SEDRATE\"  No results found for: \"FIBRINOGEN\", \"HAPTOGLOBIN\"  Lab Results   Component Value Date    PTT 39.4 (H) 03/25/2023    INR 1.26 (H) 03/25/2023     No results found for: \"\"  No results found for: \"CEA\"  No components found for: \"CA-19-9\"  No results found for: \"PSA\"  No results found for: \"SEDRATE\"       Assessment & Plan     Patient is a 63-year-old female with history of " secondary polycythemia    Secondary polycythemia  This is presumed secondary polycythemia however erythropoietin, JAK2 exon 12 mutation negative  Erythropoetin not suppressed hence goes along with secondary  cause  Regardless with her symptoms she will benefit from a hematocrit that is around 50 or less.  Now she is needing every other month. Will change phlebotomy plan to every other month. Starting next month  Same parameters    Smoking   Counseled again, working on it  She will get CT low dose screening she will schedule that has not done yet.     F/u in 6 months with cbc    Thank you very much for providing the opportunity to participate in this patient’s care. Please do not hesitate to call if there are any other questions.

## 2024-05-30 ENCOUNTER — HOSPITAL ENCOUNTER (OUTPATIENT)
Dept: CT IMAGING | Facility: HOSPITAL | Age: 64
Discharge: HOME | End: 2024-05-30
Admitting: INTERNAL MEDICINE
Payer: MEDICARE

## 2024-05-30 DIAGNOSIS — D75.1 POLYCYTHEMIA: ICD-10-CM

## 2024-05-30 DIAGNOSIS — Z87.891 PERSONAL HISTORY OF NICOTINE DEPENDENCE: ICD-10-CM

## 2024-05-30 DIAGNOSIS — F17.200 SMOKER: ICD-10-CM

## 2024-05-30 PROCEDURE — 71271 CT THORAX LUNG CANCER SCR C-: CPT

## 2024-06-24 RX ORDER — METOPROLOL SUCCINATE 100 MG/1
100 TABLET, EXTENDED RELEASE ORAL DAILY
Qty: 90 TABLET | Refills: 0 | Status: SHIPPED | OUTPATIENT
Start: 2024-06-24

## 2024-06-24 RX ORDER — ISOSORBIDE MONONITRATE 30 MG/1
30 TABLET, EXTENDED RELEASE ORAL
Qty: 90 TABLET | Refills: 0 | Status: SHIPPED | OUTPATIENT
Start: 2024-06-24

## 2024-09-12 DIAGNOSIS — D75.1 POLYCYTHEMIA: Primary | ICD-10-CM

## 2024-09-16 ENCOUNTER — TELEPHONE (OUTPATIENT)
Dept: ONCOLOGY | Facility: CLINIC | Age: 64
End: 2024-09-16
Payer: MEDICARE

## 2024-09-17 ENCOUNTER — TELEPHONE (OUTPATIENT)
Dept: ONCOLOGY | Facility: CLINIC | Age: 64
End: 2024-09-17
Payer: MEDICARE

## 2024-09-18 ENCOUNTER — TELEPHONE (OUTPATIENT)
Dept: ONCOLOGY | Facility: CLINIC | Age: 64
End: 2024-09-18
Payer: MEDICARE

## 2024-10-07 RX ORDER — ISOSORBIDE MONONITRATE 30 MG/1
30 TABLET, EXTENDED RELEASE ORAL
Qty: 30 TABLET | Refills: 0 | Status: SHIPPED | OUTPATIENT
Start: 2024-10-07

## 2024-10-07 RX ORDER — METOPROLOL SUCCINATE 100 MG/1
100 TABLET, EXTENDED RELEASE ORAL DAILY
Qty: 30 TABLET | Refills: 0 | Status: SHIPPED | OUTPATIENT
Start: 2024-10-07

## 2024-10-28 ENCOUNTER — TELEPHONE (OUTPATIENT)
Dept: ONCOLOGY | Facility: CLINIC | Age: 64
End: 2024-10-28

## 2024-10-28 NOTE — TELEPHONE ENCOUNTER
"    Caller: Ana Griggs \"Anson\"    Relationship to patient: Self    Best call back number: [phone]    Chief complaint:     Type of visit: F/U 1    Requested date: 11/21/2024 EARLY MORNING, CALL TO R/S    If rescheduling, when is the original appointment: 11/21/2024    "

## 2024-11-11 RX ORDER — ISOSORBIDE MONONITRATE 30 MG/1
30 TABLET, EXTENDED RELEASE ORAL
Qty: 30 TABLET | Refills: 0 | OUTPATIENT
Start: 2024-11-11

## 2024-11-11 RX ORDER — METOPROLOL SUCCINATE 100 MG/1
100 TABLET, EXTENDED RELEASE ORAL DAILY
Qty: 30 TABLET | Refills: 0 | OUTPATIENT
Start: 2024-11-11

## 2024-12-05 ENCOUNTER — TELEPHONE (OUTPATIENT)
Dept: ONCOLOGY | Facility: CLINIC | Age: 64
End: 2024-12-05

## 2024-12-05 NOTE — TELEPHONE ENCOUNTER
"Caller: Ana Griggs \"HAYDEN\"    Relationship:  Self    Best call back number: [phone]    PATIENT CALLED REQUESTING TO CANCEL SAME DAY APPT.    Did the patient call AFTER the start time of their scheduled appointment?  []YES  [x]NO    Was the patient's appointment rescheduled? []YES  [x]NO    Any additional information: PT NEEDS TO R/S DUE TO WEATHER, TRIED TO W/T    .   "

## 2024-12-27 NOTE — PROGRESS NOTES
HEMATOLOGY ONCOLOGY OUTPATIENT FOLLOWUP       Patient name: Ana Griggs  : 1960  MRN: [id number]  Primary Care Physician: Clay Watson MD  Referring Physician: Clay Watson,*  Reason For Consult: erythrocytosis      History of Present Illness:  Patient is a 64 y.o. female with longstanding history of smoking who was referred for work-up of elevated hemoglobin.  Patient was previously seen in 2019 for erythrocytosis at that point she had a JAK2 V6 1 7 mutation analysis which was negative.  She also had a BCR-ABL mutational analysis which was negative.  Patient had phlebotomy at that point.  She was then lost to follow-up.    3/25/2023 -WBC 9.24, hemoglobin 20.5, platelet 236 hct 59  Monthly phlebotomy  2023 - Hb 17.5, hct 53.6   Continued monthly phlebotomy  2023 - Hct 51 phlebotomy  Needed phlebotomy in 2024. 3/2024  2024 and 2024 did not need    Subjective:  25: Patient seen today for follow-up.  She was previously being seen by Dr. Malhotra in this office.  She has underlying history of secondary polycythemia likely due to chronic smoking.  She is currently on therapeutic phlebotomy protocol every 2 months room in view of concerns about cutaneous manifestations of polycythemia which include bilateral arm erythema.  Her last phlebotomy was in 2024  She continues to smoke 1 PPD, stated that she wants to quit but is struggling with this habit.,  She also reported underlying history of Rheumatoid arthritis, not on active treatment.    Past Medical History:   Diagnosis Date    Anxiety     Anxiety and depression     Atrial fibrillation     chronic    Bronchitis, chronic     Cardiomegaly     Cardiomyopathy     CHF (congestive heart failure)     COPD (chronic obstructive pulmonary disease)     Diabetes     GERD (gastroesophageal reflux disease)     Hepatitis C     HTN (hypertension)     Neuropathy     Numbness and tingling of both feet      Pain     Rheumatoid arthritis        Past Surgical History:   Procedure Laterality Date    BUNIONECTOMY Bilateral     COLONOSCOPY N/A 8/15/2019    Procedure: COLONOSCOPY WITH POLYPECTOMY X3;  Surgeon: Lilo Ferguson MD;  Location: [facility] ENDOSCOPY;  Service: Gastroenterology    DILATATION AND CURETTAGE Bilateral     FEMUR FRACTURE SURGERY Left     KNEE ARTHROSCOPY WITH OPEN LIGAMENT REPAIR Left     POSTPARTUM TUBAL LIGATION Bilateral 1992         Current Outpatient Medications:     albuterol sulfate  (90 Base) MCG/ACT inhaler, PROAIR  (90 Base) MCG/ACT AERS, Disp: , Rfl:     ALPRAZolam (XANAX) 0.5 MG tablet, ALPRAZOLAM 0.5 MG TABS, Disp: , Rfl:     aspirin 81 MG chewable tablet, Chew 1 tablet Daily., Disp: , Rfl:     bumetanide (BUMEX) 2 MG tablet, BUMETANIDE 2 MG TABS, Disp: , Rfl:     Cholecalciferol (Vitamin D3) 50 MCG (2000 UT) tablet, Take 1 tablet by mouth Daily., Disp: , Rfl:     digoxin (LANOXIN) 125 MCG tablet, Take 1 tablet by mouth Daily., Disp: , Rfl:     dilTIAZem CD (CARDIZEM CD) 180 MG 24 hr capsule, TAKE ONE CAPSULE BY MOUTH EVERY DAY, Disp: 180 capsule, Rfl: 1    DULoxetine (CYMBALTA) 60 MG capsule, Take 1 capsule by mouth Daily., Disp: , Rfl:     empagliflozin (JARDIANCE) 25 MG tablet tablet, JARDIANCE 25 MG TABS, Disp: , Rfl:     fentaNYL (DURAGESIC) 12 MCG/HR, Place 1 patch on the skin as directed by provider Every 72 (Seventy-Two) Hours., Disp: , Rfl:     Fluticasone-Umeclidin-Vilant (Trelegy Ellipta) 200-62.5-25 MCG/INH inhaler, Inhale 1 puff Daily., Disp: , Rfl:     isosorbide mononitrate (IMDUR) 30 MG 24 hr tablet, TAKE 1 TABLET BY MOUTH AT BEDTIME, Disp: 30 tablet, Rfl: 0    loratadine (CLARITIN) 10 MG tablet, Daily., Disp: , Rfl:     metFORMIN (GLUCOPHAGE) 500 MG tablet, Take 1 tablet by mouth 2 (Two) Times a Day With Meals., Disp: , Rfl:     metoprolol succinate XL (TOPROL-XL) 100 MG 24 hr tablet, TAKE 1 TABLET BY MOUTH EVERY DAY, Disp: 30 tablet, Rfl: 0    multivitamin  "with minerals tablet tablet, , Disp: , Rfl:     oxyCODONE-acetaminophen (PERCOCET)  MG per tablet, Take 1 tablet by mouth Every 6 (Six) Hours As Needed for Moderate Pain., Disp: , Rfl:     potassium chloride (K-DUR,KLOR-CON) 20 MEQ CR tablet, Take 1 tablet by mouth Daily., Disp: , Rfl:     promethazine (PHENERGAN) 25 MG tablet, Take 1 tablet by mouth 6 (Six) Times a Day., Disp: , Rfl:     rivaroxaban (XARELTO) 20 MG tablet, XARELTO 20 MG TABS, Disp: , Rfl:     Semaglutide, 1 MG/DOSE, (Ozempic, 1 MG/DOSE,) 2 MG/1.5ML solution pen-injector, Inject  under the skin into the appropriate area as directed 1 (One) Time Per Week., Disp: , Rfl:     varenicline (CHANTIX) 1 MG tablet, Take 1 tablet by mouth 2 (Two) Times a Day., Disp: , Rfl:     Allergies   Allergen Reactions    Medrol [Methylprednisolone] Swelling     Dose Julio only       Family History   Problem Relation Age of Onset    No Known Problems Mother     No Known Problems Father     Breast cancer Sister        Cancer-related family history includes Breast cancer in her sister.      Social History     Tobacco Use    Smoking status: Every Day     Current packs/day: 1.50     Average packs/day: 1.5 packs/day for 48.0 years (72.0 ttl pk-yrs)     Types: Cigarettes     Start date: 1/1/1977    Smokeless tobacco: Never    Tobacco comments:     Patient is advised to quit smoking   Vaping Use    Vaping status: Never Used   Substance Use Topics    Alcohol use: Yes     Comment: occasional    Drug use: No     Social History     Social History Narrative    Not on file     Objective:    Vital Signs:  Vitals:    01/02/25 1418   BP: 119/78   Pulse: 74   Resp: 16   Temp: 97.8 °F (36.6 °C)   SpO2: 97%   Weight: 101 kg (223 lb 3.2 oz)   Height: 170.2 cm (67\")   PainSc: 0-No pain         Body mass index is 34.96 kg/m².    ECOG  (0) Fully active, able to carry on all predisease performance without restriction    Physical Exam:   Physical Exam  Constitutional:       Appearance: " "Normal appearance.   HENT:      Head: Normocephalic and atraumatic.      Mouth/Throat:      Mouth: Mucous membranes are moist.   Eyes:      Pupils: Pupils are equal, round, and reactive to light.   Cardiovascular:      Rate and Rhythm: Normal rate and regular rhythm.      Pulses: Normal pulses.      Heart sounds: No murmur heard.  Pulmonary:      Effort: Pulmonary effort is normal.      Breath sounds: Normal breath sounds.   Abdominal:      General: There is no distension.      Palpations: Abdomen is soft. There is no mass.      Tenderness: There is no abdominal tenderness.   Musculoskeletal:         General: Normal range of motion.      Cervical back: Normal range of motion and neck supple.   Skin:     General: Skin is warm.   Neurological:      General: No focal deficit present.      Mental Status: She is alert.   Psychiatric:         Mood and Affect: Mood normal.         Lab Results - Last 18 Months   Lab Units 05/24/24  1352   HEMATOCRIT % 50.0*     No results for input(s): \"NA\", \"K\", \"CL\", \"CO2\", \"BUN\", \"CREATININE\", \"CALCIUM\", \"BILITOT\", \"ALKPHOS\", \"ALT\", \"AST\", \"GLUCOSE\" in the last 01674 hours.    Invalid input(s): \"LABALBU\", \"PROT\"      Lab Results   Component Value Date    GLUCOSE 111 (H) 03/25/2023    BUN 13 03/25/2023    CREATININE 0.89 03/25/2023    EGFRIFNONA 103 07/01/2019    BCR 14.6 03/25/2023    K 4.5 03/25/2023    CO2 30.5 (H) 03/25/2023    CALCIUM 10.4 03/25/2023    ALBUMIN 3.60 07/01/2019    LABIL2 1.0 03/12/2019    AST 23 07/01/2019    ALT 26 07/01/2019       No results for input(s): \"APTT\", \"INR\", \"PTT\" in the last 13307 hours.      No results found for: \"IRON\", \"TIBC\", \"FERRITIN\"    No results found for: \"FOLATE\"    No results found for: \"OCCULTBLD\"    No results found for: \"RETICCTPCT\"  No results found for: \"[id number]\"  No results found for: \"SPEP\", \"UPEP\"  No results found for: \"LDH\", \"URICACID\"  No results found for: \"JOSELIN\", \"RF\", \"SEDRATE\"  No results found for: \"FIBRINOGEN\", " "\"HAPTOGLOBIN\"  Lab Results   Component Value Date    PTT 39.4 (H) 03/25/2023    INR 1.26 (H) 03/25/2023     No results found for: \"\"  No results found for: \"CEA\"  No components found for: \"CA-19-9\"  No results found for: \"PSA\"  No results found for: \"SEDRATE\"       Assessment & Plan     Patient is a 64 y.o.  female with history of secondary polycythemia    Secondary polycythemia  This is presumed secondary polycythemia however erythropoietin, JAK2 exon 12 mutation negative  Erythropoetin not suppressed hence goes along with secondary  cause  Regardless with her symptoms she will benefit from a hematocrit that is around 50 or less.  Currently on phlebotomy protocol every other month, last one in December 2024.    Skin changes: involving bilateral arms. Unclear whether this is associated with polycythemia or not. Will refer her to Dermatology for further evaluation.    Smoking   Counseled again, working on it  Low dose CT chest in May 2024 reported benign. Due again in 1 year.    F/u in 4 months with labs, sooner as needed.    Thank you very much for providing the opportunity to participate in this patient’s care. Please do not hesitate to call if there are any other questions.    "

## 2025-01-02 ENCOUNTER — OFFICE VISIT (OUTPATIENT)
Dept: ONCOLOGY | Facility: CLINIC | Age: 65
End: 2025-01-02
Payer: MEDICARE

## 2025-01-02 VITALS
OXYGEN SATURATION: 97 % | BODY MASS INDEX: 35.03 KG/M2 | TEMPERATURE: 97.8 F | HEIGHT: 67 IN | HEART RATE: 74 BPM | WEIGHT: 223.2 LBS | SYSTOLIC BLOOD PRESSURE: 119 MMHG | DIASTOLIC BLOOD PRESSURE: 78 MMHG | RESPIRATION RATE: 16 BRPM

## 2025-01-02 DIAGNOSIS — D50.9 IRON DEFICIENCY ANEMIA, UNSPECIFIED IRON DEFICIENCY ANEMIA TYPE: ICD-10-CM

## 2025-01-02 DIAGNOSIS — Z79.01 LONG TERM CURRENT USE OF ANTICOAGULANT THERAPY: ICD-10-CM

## 2025-01-02 DIAGNOSIS — L53.9 ERYTHEMATOUS CONDITION, UNSPECIFIED: ICD-10-CM

## 2025-01-02 DIAGNOSIS — D75.1 POLYCYTHEMIA: Primary | ICD-10-CM

## 2025-01-02 DIAGNOSIS — Z87.891 PERSONAL HISTORY OF NICOTINE DEPENDENCE: ICD-10-CM

## 2025-01-02 RX ORDER — TIRZEPATIDE 12.5 MG/.5ML
12.5 INJECTION, SOLUTION SUBCUTANEOUS
COMMUNITY

## 2025-02-06 DIAGNOSIS — I10 BENIGN ESSENTIAL HTN: ICD-10-CM

## 2025-02-06 DIAGNOSIS — I42.0 CARDIOMYOPATHY, DILATED: ICD-10-CM

## 2025-02-06 DIAGNOSIS — I48.0 PAROXYSMAL ATRIAL FIBRILLATION: ICD-10-CM

## 2025-02-06 DIAGNOSIS — E11.9 TYPE 2 DIABETES MELLITUS WITHOUT COMPLICATION, UNSPECIFIED WHETHER LONG TERM INSULIN USE: ICD-10-CM

## 2025-02-06 DIAGNOSIS — I50.9 CONGESTIVE HEART FAILURE, UNSPECIFIED HF CHRONICITY, UNSPECIFIED HEART FAILURE TYPE: ICD-10-CM

## 2025-02-06 RX ORDER — DILTIAZEM HYDROCHLORIDE 180 MG/1
180 CAPSULE, COATED, EXTENDED RELEASE ORAL DAILY
Refills: 0 | OUTPATIENT
Start: 2025-02-06

## 2025-03-12 ENCOUNTER — TRANSCRIBE ORDERS (OUTPATIENT)
Dept: ADMINISTRATIVE | Facility: HOSPITAL | Age: 65
End: 2025-03-12
Payer: MEDICARE

## 2025-03-12 DIAGNOSIS — Z12.31 ENCOUNTER FOR SCREENING MAMMOGRAM FOR MALIGNANT NEOPLASM OF BREAST: Primary | ICD-10-CM

## 2025-04-10 LAB
NCCN CRITERIA FLAG: NORMAL
TYRER CUZICK SCORE: 9.7

## 2025-04-11 ENCOUNTER — HOSPITAL ENCOUNTER (OUTPATIENT)
Dept: MAMMOGRAPHY | Facility: HOSPITAL | Age: 65
Discharge: HOME | End: 2025-04-11
Admitting: FAMILY MEDICINE
Payer: MEDICARE

## 2025-04-11 DIAGNOSIS — Z12.31 ENCOUNTER FOR SCREENING MAMMOGRAM FOR MALIGNANT NEOPLASM OF BREAST: ICD-10-CM

## 2025-04-11 PROCEDURE — 77067 SCR MAMMO BI INCL CAD: CPT

## 2025-04-11 PROCEDURE — 77063 BREAST TOMOSYNTHESIS BI: CPT

## 2025-05-01 ENCOUNTER — CLINICAL SUPPORT (OUTPATIENT)
Dept: FAMILY MEDICINE CLINIC | Facility: CLINIC | Age: 65
End: 2025-05-01
Payer: MEDICARE

## 2025-05-01 DIAGNOSIS — D50.9 IRON DEFICIENCY ANEMIA, UNSPECIFIED IRON DEFICIENCY ANEMIA TYPE: ICD-10-CM

## 2025-05-01 DIAGNOSIS — D75.1 POLYCYTHEMIA: ICD-10-CM

## 2025-05-01 LAB
BASOPHILS # BLD AUTO: 0.04 10*3/MM3 (ref 0–0.2)
BASOPHILS NFR BLD AUTO: 0.5 % (ref 0–1.5)
DEPRECATED RDW RBC AUTO: 51.1 FL (ref 37–54)
EOSINOPHIL # BLD AUTO: 0.09 10*3/MM3 (ref 0–0.4)
EOSINOPHIL NFR BLD AUTO: 1 % (ref 0.3–6.2)
ERYTHROCYTE [DISTWIDTH] IN BLOOD BY AUTOMATED COUNT: 17.5 % (ref 12.3–15.4)
FERRITIN SERPL-MCNC: 45.9 NG/ML (ref 13–150)
HCT VFR BLD AUTO: 57.1 % (ref 34–46.6)
HGB BLD-MCNC: 18 G/DL (ref 12–15.9)
IMM GRANULOCYTES # BLD AUTO: 0.02 10*3/MM3 (ref 0–0.05)
IMM GRANULOCYTES NFR BLD AUTO: 0.2 % (ref 0–0.5)
IRON 24H UR-MRATE: 61 MCG/DL (ref 37–145)
IRON SATN MFR SERPL: 12 % (ref 20–50)
LYMPHOCYTES # BLD AUTO: 2.54 10*3/MM3 (ref 0.7–3.1)
LYMPHOCYTES NFR BLD AUTO: 29.4 % (ref 19.6–45.3)
MCH RBC QN AUTO: 27.6 PG (ref 26.6–33)
MCHC RBC AUTO-ENTMCNC: 31.5 G/DL (ref 31.5–35.7)
MCV RBC AUTO: 87.6 FL (ref 79–97)
MONOCYTES # BLD AUTO: 0.9 10*3/MM3 (ref 0.1–0.9)
MONOCYTES NFR BLD AUTO: 10.4 % (ref 5–12)
NEUTROPHILS NFR BLD AUTO: 5.06 10*3/MM3 (ref 1.7–7)
NEUTROPHILS NFR BLD AUTO: 58.5 % (ref 42.7–76)
NRBC BLD AUTO-RTO: 0 /100 WBC (ref 0–0.2)
PLATELET # BLD AUTO: 239 10*3/MM3 (ref 140–450)
PMV BLD AUTO: 9.9 FL (ref 6–12)
RBC # BLD AUTO: 6.52 10*6/MM3 (ref 3.77–5.28)
RETICS # AUTO: 0.11 10*6/MM3 (ref 0.02–0.13)
RETICS/RBC NFR AUTO: 1.72 % (ref 0.7–1.9)
TIBC SERPL-MCNC: 504 MCG/DL (ref 298–536)
TRANSFERRIN SERPL-MCNC: 338 MG/DL (ref 200–360)
WBC NRBC COR # BLD AUTO: 8.65 10*3/MM3 (ref 3.4–10.8)

## 2025-05-01 PROCEDURE — 83540 ASSAY OF IRON: CPT | Performed by: STUDENT IN AN ORGANIZED HEALTH CARE EDUCATION/TRAINING PROGRAM

## 2025-05-01 PROCEDURE — 85045 AUTOMATED RETICULOCYTE COUNT: CPT | Performed by: STUDENT IN AN ORGANIZED HEALTH CARE EDUCATION/TRAINING PROGRAM

## 2025-05-01 PROCEDURE — 82728 ASSAY OF FERRITIN: CPT | Performed by: STUDENT IN AN ORGANIZED HEALTH CARE EDUCATION/TRAINING PROGRAM

## 2025-05-01 PROCEDURE — 84466 ASSAY OF TRANSFERRIN: CPT | Performed by: STUDENT IN AN ORGANIZED HEALTH CARE EDUCATION/TRAINING PROGRAM

## 2025-05-01 PROCEDURE — 85025 COMPLETE CBC W/AUTO DIFF WBC: CPT | Performed by: STUDENT IN AN ORGANIZED HEALTH CARE EDUCATION/TRAINING PROGRAM

## 2025-05-01 NOTE — PROGRESS NOTES
Venipuncture Blood Specimen Collection  Venipuncture performed in RT ARM by Chemo Ledbetter with good hemostasis. Patient tolerated the procedure well without complications.   05/01/25   Chemo Ledbetter

## 2025-05-06 NOTE — PROGRESS NOTES
HEMATOLOGY ONCOLOGY OUTPATIENT FOLLOWUP       Patient name: Ana Griggs  : 1960  MRN: [id number]  Primary Care Physician: Clay Watson MD  Referring Physician: No ref. provider found  Reason For Consult: erythrocytosis      History of Present Illness:  Patient is a 64 y.o. female with longstanding history of smoking who was referred for work-up of elevated hemoglobin.  Patient was previously seen in 2019 for erythrocytosis at that point she had a JAK2 V6 1 7 mutation analysis which was negative.  She also had a BCR-ABL mutational analysis which was negative.  Patient had phlebotomy at that point.  She was then lost to follow-up.    3/25/2023 -WBC 9.24, hemoglobin 20.5, platelet 236 hct 59  Monthly phlebotomy  2023 - Hb 17.5, hct 53.6   Continued monthly phlebotomy  2023 - Hct 51 phlebotomy  Needed phlebotomy in 2024. 3/2024  2024 and 2024 did not need    25: Patient seen today for follow-up.  She was previously being seen by Dr. Malhotra in this office.  She has underlying history of secondary polycythemia likely due to chronic smoking.  She is currently on therapeutic phlebotomy protocol every 2 months room in view of concerns about cutaneous manifestations of polycythemia which include bilateral arm erythema.  Her last phlebotomy was in 2024  She continues to smoke 1 PPD, stated that she wants to quit but is struggling with this habit.,  She also reported underlying history of Rheumatoid arthritis, not on active treatment.    History of Present Illness  25: The patient is a 64-year-old female who presents for evaluation of polycythemia.  Her last phlebotomy was conducted on 2025. She had a scheduled appointment for phlebotomy blood work at [facility] on 2025 but missed it. She is scheduled for a phlebotomy tomorrow at [facility], where she has been informed that a new order is required for the procedure. She reports  experiencing dizziness, which she attributes to her elevated blood levels and allergies.  She is currently attempting to reduce her smoking habit from 1.5 packs per day to 1 pack per day. Despite her chronic atrial fibrillation and congestive heart failure, she continues to struggle with setting a quit date for smoking. She has been a smoker for approximately 50 years and has considered using patches as a cessation aid. She acknowledges the health risks associated with smoking, including its potential impact on her blood problems. She has not yet scheduled her repeat CT scan, which was last performed in 05/2024.      Past Medical History:   Diagnosis Date    Anxiety     Anxiety and depression     Atrial fibrillation     chronic    Bronchitis, chronic     Cardiomegaly     Cardiomyopathy     CHF (congestive heart failure)     COPD (chronic obstructive pulmonary disease)     Diabetes     GERD (gastroesophageal reflux disease)     Hepatitis C     HTN (hypertension)     Neuropathy     Numbness and tingling of both feet     Pain     Rheumatoid arthritis        Past Surgical History:   Procedure Laterality Date    BUNIONECTOMY Bilateral     COLONOSCOPY N/A 8/15/2019    Procedure: COLONOSCOPY WITH POLYPECTOMY X3;  Surgeon: Lilo Ferguson MD;  Location: [facility] ENDOSCOPY;  Service: Gastroenterology    DILATATION AND CURETTAGE Bilateral     FEMUR FRACTURE SURGERY Left     KNEE ARTHROSCOPY WITH OPEN LIGAMENT REPAIR Left     POSTPARTUM TUBAL LIGATION Bilateral 1992         Current Outpatient Medications:     albuterol sulfate  (90 Base) MCG/ACT inhaler, PROAIR  (90 Base) MCG/ACT AERS, Disp: , Rfl:     ALPRAZolam (XANAX) 0.5 MG tablet, ALPRAZOLAM 0.5 MG TABS, Disp: , Rfl:     aspirin 81 MG chewable tablet, Chew 1 tablet Daily., Disp: , Rfl:     bumetanide (BUMEX) 2 MG tablet, BUMETANIDE 2 MG TABS, Disp: , Rfl:     Cholecalciferol (Vitamin D3) 50 MCG (2000 UT) tablet, Take 1 tablet by mouth Daily., Disp: , Rfl:      digoxin (LANOXIN) 125 MCG tablet, Take 1 tablet by mouth Daily., Disp: , Rfl:     dilTIAZem CD (CARDIZEM CD) 180 MG 24 hr capsule, TAKE ONE CAPSULE BY MOUTH EVERY DAY, Disp: 180 capsule, Rfl: 1    DULoxetine (CYMBALTA) 60 MG capsule, Take 1 capsule by mouth Daily., Disp: , Rfl:     empagliflozin (JARDIANCE) 25 MG tablet tablet, JARDIANCE 25 MG TABS, Disp: , Rfl:     fentaNYL (DURAGESIC) 12 MCG/HR, Place 1 patch on the skin as directed by provider Every 72 (Seventy-Two) Hours., Disp: , Rfl:     Fluticasone-Umeclidin-Vilant (Trelegy Ellipta) 200-62.5-25 MCG/INH inhaler, Inhale 1 puff Daily., Disp: , Rfl:     isosorbide mononitrate (IMDUR) 30 MG 24 hr tablet, TAKE 1 TABLET BY MOUTH AT BEDTIME, Disp: 30 tablet, Rfl: 0    loratadine (CLARITIN) 10 MG tablet, Daily., Disp: , Rfl:     metFORMIN (GLUCOPHAGE) 500 MG tablet, Take 1 tablet by mouth 2 (Two) Times a Day With Meals., Disp: , Rfl:     metoprolol succinate XL (TOPROL-XL) 100 MG 24 hr tablet, TAKE 1 TABLET BY MOUTH EVERY DAY, Disp: 30 tablet, Rfl: 0    multivitamin with minerals tablet tablet, , Disp: , Rfl:     oxyCODONE-acetaminophen (PERCOCET)  MG per tablet, Take 1 tablet by mouth Every 6 (Six) Hours As Needed for Moderate Pain., Disp: , Rfl:     potassium chloride (K-DUR,KLOR-CON) 20 MEQ CR tablet, Take 1 tablet by mouth Daily., Disp: , Rfl:     promethazine (PHENERGAN) 25 MG tablet, Take 1 tablet by mouth 6 (Six) Times a Day., Disp: , Rfl:     rivaroxaban (XARELTO) 20 MG tablet, XARELTO 20 MG TABS, Disp: , Rfl:     Tirzepatide (Mounjaro) 12.5 MG/0.5ML solution auto-injector, Inject 12.5 mg under the skin into the appropriate area as directed Every 7 (Seven) Days., Disp: , Rfl:     varenicline (CHANTIX) 1 MG tablet, Take 1 tablet by mouth 2 (Two) Times a Day., Disp: , Rfl:     Allergies   Allergen Reactions    Medrol [Methylprednisolone] Swelling     Dose Julio only       Family History   Problem Relation Age of Onset    No Known Problems Mother     No Known  "Problems Father     Breast cancer Sister        Cancer-related family history includes Breast cancer in her sister.      Social History     Tobacco Use    Smoking status: Every Day     Current packs/day: 1.50     Average packs/day: 1.5 packs/day for 48.3 years (72.5 ttl pk-yrs)     Types: Cigarettes     Start date: 1/1/1977    Smokeless tobacco: Never    Tobacco comments:     Patient is advised to quit smoking   Vaping Use    Vaping status: Never Used   Substance Use Topics    Alcohol use: Yes     Comment: occasional    Drug use: No     Social History     Social History Narrative    Not on file     Objective:    Vital Signs:  Vitals:    05/08/25 1417   BP: 124/84   Pulse: 98   Resp: 14   Temp: 97.4 °F (36.3 °C)   SpO2: 94%   Weight: 101 kg (223 lb 3.2 oz)   Height: 170.2 cm (67\")   PainSc: 0-No pain           Body mass index is 34.96 kg/m².    ECOG  (0) Fully active, able to carry on all predisease performance without restriction    Physical Exam:   Physical Exam  Constitutional:       Appearance: Normal appearance.   HENT:      Head: Normocephalic and atraumatic.      Mouth/Throat:      Mouth: Mucous membranes are moist.   Eyes:      Pupils: Pupils are equal, round, and reactive to light.   Cardiovascular:      Rate and Rhythm: Normal rate and regular rhythm.      Pulses: Normal pulses.      Heart sounds: No murmur heard.  Pulmonary:      Effort: Pulmonary effort is normal.      Breath sounds: Normal breath sounds.   Abdominal:      General: There is no distension.      Palpations: Abdomen is soft. There is no mass.      Tenderness: There is no abdominal tenderness.   Musculoskeletal:         General: Normal range of motion.      Cervical back: Normal range of motion and neck supple.   Skin:     General: Skin is warm.   Neurological:      General: No focal deficit present.      Mental Status: She is alert.   Psychiatric:         Mood and Affect: Mood normal.         Lab Results - Last 18 Months   Lab Units " "05/01/25  1418 09/12/24  1219 07/11/24  1327   WBC 10*3/mm3 8.65  --  9.98   HEMOGLOBIN g/dL 18.0* 16.4* 16.7*   HEMATOCRIT % 57.1* 51* 51.8*   PLATELETS 10*3/mm3 239  --  270   MCV fL 87.6  --  78.7*     No results for input(s): \"NA\", \"K\", \"CL\", \"CO2\", \"BUN\", \"CREATININE\", \"CALCIUM\", \"BILITOT\", \"ALKPHOS\", \"ALT\", \"AST\", \"GLUCOSE\" in the last 21521 hours.    Invalid input(s): \"LABALBU\", \"PROT\"      Lab Results   Component Value Date    GLUCOSE 111 (H) 03/25/2023    BUN 13 03/25/2023    CREATININE 0.89 03/25/2023    EGFRIFNONA 103 07/01/2019    BCR 14.6 03/25/2023    K 4.5 03/25/2023    CO2 30.5 (H) 03/25/2023    CALCIUM 10.4 03/25/2023    ALBUMIN 3.60 07/01/2019    AST 23 07/01/2019    ALT 26 07/01/2019       No results for input(s): \"APTT\", \"INR\", \"PTT\" in the last 65148 hours.      Lab Results   Component Value Date    IRON 61 05/01/2025    TIBC 504 05/01/2025    FERRITIN 45.90 05/01/2025       No results found for: \"FOLATE\"    No results found for: \"OCCULTBLD\"    Lab Results   Component Value Date    RETICCTPCT 1.72 05/01/2025     No results found for: \"[id number]\"  No results found for: \"SPEP\", \"UPEP\"  No results found for: \"LDH\", \"URICACID\"  No results found for: \"JOSELIN\", \"RF\", \"SEDRATE\"  No results found for: \"FIBRINOGEN\", \"HAPTOGLOBIN\"  Lab Results   Component Value Date    PTT 39.4 (H) 03/25/2023    INR 1.26 (H) 03/25/2023     No results found for: \"\"  No results found for: \"CEA\"  No components found for: \"CA-19-9\"  No results found for: \"PSA\"  No results found for: \"SEDRATE\"       Assessment & Plan     Patient is a 64 y.o.  female with history of secondary polycythemia    Secondary polycythemia  This is presumed secondary polycythemia however erythropoietin, JAK2 exon 12 mutation negative  Erythropoetin not suppressed hence goes along with secondary  cause  Regardless with her symptoms she will benefit from a hematocrit that is around 50 or less.  Currently on phlebotomy protocol every other month, last " one in Feb 2024.   -Recent labs showed Hb/hct 18.0/57.1, Will schedule for an additional phlebotomy session next month, continue every other month thereafter.      Skin changes: involving bilateral arms. Unclear whether this is associated with polycythemia or not. Will refer her to Dermatology for further evaluation.    Smoking   Counseled again, working on it  Low dose CT chest in May 2024 reported benign. Due again at this time. Will Schedule her prior to follow up.    F/u in 4 months with labs, sooner as needed.    Thank you very much for providing the opportunity to participate in this patient’s care. Please do not hesitate to call if there are any other questions.

## 2025-05-08 ENCOUNTER — OFFICE VISIT (OUTPATIENT)
Dept: ONCOLOGY | Facility: CLINIC | Age: 65
End: 2025-05-08
Payer: MEDICARE

## 2025-05-08 VITALS
TEMPERATURE: 97.4 F | SYSTOLIC BLOOD PRESSURE: 124 MMHG | HEIGHT: 67 IN | DIASTOLIC BLOOD PRESSURE: 84 MMHG | RESPIRATION RATE: 14 BRPM | HEART RATE: 98 BPM | WEIGHT: 223.2 LBS | OXYGEN SATURATION: 94 % | BODY MASS INDEX: 35.03 KG/M2

## 2025-05-08 DIAGNOSIS — L53.9 ERYTHEMATOUS CONDITION, UNSPECIFIED: ICD-10-CM

## 2025-05-08 DIAGNOSIS — F17.200 SMOKER: ICD-10-CM

## 2025-05-08 DIAGNOSIS — Z79.01 LONG TERM CURRENT USE OF ANTICOAGULANT THERAPY: ICD-10-CM

## 2025-05-08 DIAGNOSIS — D75.1 POLYCYTHEMIA: Primary | ICD-10-CM

## 2025-05-08 DIAGNOSIS — Z87.891 PERSONAL HISTORY OF NICOTINE DEPENDENCE: ICD-10-CM

## (undated) DEVICE — TRAP WIDEEYE POLYP

## (undated) DEVICE — PK ENDO GI 50

## (undated) DEVICE — SNAR POLYP CAPTIVATOR HEX 27MM 240CM

## (undated) DEVICE — PAPR PRNT PK SONY W RIBN UPC55